# Patient Record
Sex: MALE | Race: WHITE | NOT HISPANIC OR LATINO | Employment: OTHER | URBAN - METROPOLITAN AREA
[De-identification: names, ages, dates, MRNs, and addresses within clinical notes are randomized per-mention and may not be internally consistent; named-entity substitution may affect disease eponyms.]

---

## 2017-02-27 ENCOUNTER — TRANSCRIBE ORDERS (OUTPATIENT)
Dept: ADMINISTRATIVE | Facility: HOSPITAL | Age: 67
End: 2017-02-27

## 2017-02-27 ENCOUNTER — LAB (OUTPATIENT)
Dept: LAB | Facility: HOSPITAL | Age: 67
End: 2017-02-27
Attending: ORTHOPAEDIC SURGERY

## 2017-02-27 ENCOUNTER — HOSPITAL ENCOUNTER (OUTPATIENT)
Dept: CARDIOLOGY | Facility: HOSPITAL | Age: 67
Discharge: HOME | End: 2017-02-27
Attending: ORTHOPAEDIC SURGERY

## 2017-02-27 ENCOUNTER — HOSPITAL ENCOUNTER (OUTPATIENT)
Dept: GENERAL RADIOLOGY | Facility: HOSPITAL | Age: 67
Discharge: HOME | End: 2017-02-27
Attending: ORTHOPAEDIC SURGERY | Admitting: ORTHOPAEDIC SURGERY

## 2017-02-27 DIAGNOSIS — Z01.818 PRE-OP TESTING: ICD-10-CM

## 2017-02-27 DIAGNOSIS — Z01.811 PRE-OP CHEST EXAM: ICD-10-CM

## 2017-02-27 DIAGNOSIS — M16.12 OSTEOARTHRITIS OF LEFT HIP, UNSPECIFIED OSTEOARTHRITIS TYPE: ICD-10-CM

## 2017-02-27 DIAGNOSIS — M16.12 OSTEOARTHRITIS OF LEFT HIP, UNSPECIFIED OSTEOARTHRITIS TYPE: Primary | ICD-10-CM

## 2017-02-27 LAB
ANION GAP SERPL CALCULATED.3IONS-SCNC: 12.7 MMOL/L
APTT PPP: 25.5 SECONDS (ref 22.7–35.4)
BASOPHILS # BLD AUTO: 0 10*3/MM3 (ref 0–0.2)
BASOPHILS NFR BLD AUTO: 0 % (ref 0–1.5)
BILIRUB UR QL STRIP: NEGATIVE
BUN BLD-MCNC: 16 MG/DL (ref 8–23)
BUN/CREAT SERPL: 16.7 (ref 7–25)
CALCIUM SPEC-SCNC: 9.6 MG/DL (ref 8.6–10.5)
CHLORIDE SERPL-SCNC: 102 MMOL/L (ref 98–107)
CLARITY UR: CLEAR
CO2 SERPL-SCNC: 24.3 MMOL/L (ref 22–29)
COLOR UR: YELLOW
CREAT BLD-MCNC: 0.96 MG/DL (ref 0.76–1.27)
DEPRECATED RDW RBC AUTO: 43 FL (ref 37–54)
EOSINOPHIL # BLD AUTO: 0.15 10*3/MM3 (ref 0–0.7)
EOSINOPHIL NFR BLD AUTO: 1.9 % (ref 0.3–6.2)
ERYTHROCYTE [DISTWIDTH] IN BLOOD BY AUTOMATED COUNT: 12.8 % (ref 11.5–14.5)
GFR SERPL CREATININE-BSD FRML MDRD: 79 ML/MIN/1.73
GLUCOSE BLD-MCNC: 118 MG/DL (ref 65–99)
GLUCOSE UR STRIP-MCNC: NEGATIVE MG/DL
HBA1C MFR BLD: 6.3 % (ref 4.8–5.6)
HCT VFR BLD AUTO: 44.7 % (ref 40.4–52.2)
HGB BLD-MCNC: 15.3 G/DL (ref 13.7–17.6)
HGB UR QL STRIP.AUTO: NEGATIVE
IMM GRANULOCYTES # BLD: 0.02 10*3/MM3 (ref 0–0.03)
IMM GRANULOCYTES NFR BLD: 0.3 % (ref 0–0.5)
INR PPP: 0.95 (ref 0.9–1.1)
KETONES UR QL STRIP: NEGATIVE
LEUKOCYTE ESTERASE UR QL STRIP.AUTO: NEGATIVE
LYMPHOCYTES # BLD AUTO: 1.81 10*3/MM3 (ref 0.9–4.8)
LYMPHOCYTES NFR BLD AUTO: 23.3 % (ref 19.6–45.3)
MCH RBC QN AUTO: 31.8 PG (ref 27–32.7)
MCHC RBC AUTO-ENTMCNC: 34.2 G/DL (ref 32.6–36.4)
MCV RBC AUTO: 92.9 FL (ref 79.8–96.2)
MONOCYTES # BLD AUTO: 0.51 10*3/MM3 (ref 0.2–1.2)
MONOCYTES NFR BLD AUTO: 6.6 % (ref 5–12)
NEUTROPHILS # BLD AUTO: 5.29 10*3/MM3 (ref 1.9–8.1)
NEUTROPHILS NFR BLD AUTO: 67.9 % (ref 42.7–76)
NITRITE UR QL STRIP: NEGATIVE
PH UR STRIP.AUTO: <=5 [PH] (ref 5–8)
PLATELET # BLD AUTO: 183 10*3/MM3 (ref 140–500)
PMV BLD AUTO: 10.8 FL (ref 6–12)
POTASSIUM BLD-SCNC: 4.8 MMOL/L (ref 3.5–5.2)
PROT UR QL STRIP: NEGATIVE
PROTHROMBIN TIME: 12.3 SECONDS (ref 11.7–14.2)
RBC # BLD AUTO: 4.81 10*6/MM3 (ref 4.6–6)
SODIUM BLD-SCNC: 139 MMOL/L (ref 136–145)
SP GR UR STRIP: 1.02 (ref 1–1.03)
UROBILINOGEN UR QL STRIP: NORMAL
WBC NRBC COR # BLD: 7.78 10*3/MM3 (ref 4.5–10.7)

## 2017-02-27 PROCEDURE — 85610 PROTHROMBIN TIME: CPT

## 2017-02-27 PROCEDURE — 85730 THROMBOPLASTIN TIME PARTIAL: CPT

## 2017-02-27 PROCEDURE — 85025 COMPLETE CBC W/AUTO DIFF WBC: CPT

## 2017-02-27 PROCEDURE — 93005 ELECTROCARDIOGRAM TRACING: CPT | Performed by: ORTHOPAEDIC SURGERY

## 2017-02-27 PROCEDURE — 81003 URINALYSIS AUTO W/O SCOPE: CPT

## 2017-02-27 PROCEDURE — 71020 HC CHEST PA AND LATERAL: CPT

## 2017-02-27 PROCEDURE — 80048 BASIC METABOLIC PNL TOTAL CA: CPT

## 2017-02-27 PROCEDURE — 93010 ELECTROCARDIOGRAM REPORT: CPT | Performed by: INTERNAL MEDICINE

## 2017-02-27 PROCEDURE — 83036 HEMOGLOBIN GLYCOSYLATED A1C: CPT

## 2017-02-27 PROCEDURE — 36415 COLL VENOUS BLD VENIPUNCTURE: CPT

## 2017-03-06 RX ORDER — ATORVASTATIN CALCIUM 20 MG/1
20 TABLET, FILM COATED ORAL DAILY
COMMUNITY

## 2017-03-06 RX ORDER — ZINC GLUCONATE 50 MG
1 TABLET ORAL DAILY
COMMUNITY

## 2017-03-06 RX ORDER — CHLORAL HYDRATE 500 MG
1000 CAPSULE ORAL
COMMUNITY

## 2017-03-06 RX ORDER — ASPIRIN 81 MG/1
81 TABLET, CHEWABLE ORAL DAILY
COMMUNITY
End: 2017-03-08 | Stop reason: HOSPADM

## 2017-03-06 RX ORDER — MELOXICAM 15 MG/1
15 TABLET ORAL DAILY
COMMUNITY
End: 2017-03-08 | Stop reason: HOSPADM

## 2017-03-06 RX ORDER — VITAMIN B COMPLEX
1 CAPSULE ORAL DAILY
COMMUNITY

## 2017-03-06 RX ORDER — GABAPENTIN 300 MG/1
400 CAPSULE ORAL 2 TIMES DAILY
COMMUNITY
End: 2022-09-15

## 2017-03-06 RX ORDER — DOXAZOSIN 2 MG/1
2 TABLET ORAL NIGHTLY
COMMUNITY
End: 2022-09-15

## 2017-03-06 RX ORDER — LISINOPRIL AND HYDROCHLOROTHIAZIDE 20; 12.5 MG/1; MG/1
1 TABLET ORAL DAILY
COMMUNITY
End: 2022-09-15 | Stop reason: ALTCHOICE

## 2017-03-06 NOTE — H&P
Provider: BARRINGTON LEBLANC MD  GELY WOODWARD is a 66 year old male whose main reason for today's visit is pain in the left knee which has been present for 3 years.  The patient denies an injury.  The patient is on pain meds(MOBIC 15 MG TABS (MELOXICAM)).  The patient states that he is having a sharp pain which he rates at a 10 on a scale from 1-10.  The pain occurs intermittently.  The pain is located on the side of the knee.  The pain occurs on the left side only.  The pain is not radiating.  The patient states that rest makes it better, while activity, standing and walking makes it worse.  Treatments which did not give relief include joint injections performed by Dr Leblanc.    He is known to me from his nonoperative management for the left knee osteoarthritis.  He has received a Kenalog injections, most recent was in November 2016.  He had good relief for  Several weeks, but the injection has worn off.  He feels that the injection is not giving him as much relief as it used to.  He has had several injections in the past.  He continues to use the Mobic.  The medicine is not helping.  He has difficulty with activities of daily living.  He runs a wood  shop.  He has difficulty with  stairs, difficulty getting up from a seated position.  Complains of night pain.  Complains of limp.  He does not use any assistive device to ambulate.  He does feel partial giving way, especially on inclines.  He has a history of right total knee arthroplasty by Dr. Temo Nicolas in 2000.  He is overall very satisfied with the right knee replacement.  He would like to proceed with a left total knee arthroplasty.  Denies any history of MRSA.  Denies any history of DVT.  Has a history of diabetes mellitus type 1.  Negative metal sensitivity.  Review of Systems:  Positive for: Joint Pain.    Patient denies: Abdominal Pain, Bleeding, Chest Pain, Convulsions/Seizure, Decreased Motion, Depression, Difficulty Swallowing, Easy  Bruisability, Emotional Disturbances, Eyes or Vision Problems, Fecal Incontinence, Fever/Chills, Headaches, Increased Thirst, Increased Hunger, Insomnia, Nausea/Vomiting, Night Sweats, Poor Balance, Persistent Cough, Rash, Shortness of Breath, Shortness of Breath While Lying down, Skin Problems, Urinary Retention and Weakness.  Allergies:  * no known allergies  * metal - negative  Medications:  mobic 15 mg tabs (meloxicam) 1 po qd with food  mobic 15 mg tabs (meloxicam) 1 po qd with food  gabapentin 300 mg oral caps (gabapentin) daily  doxazosin mesylate 2 mg oral tabs (doxazosin mesylate) daily  aspirin low dose 81 mg oral tbec (aspirin) daily  atorvastatin calcium 20 mg oral tabs (atorvastatin calcium) daily  metformin hcl 500 mg oral tabs (metformin hcl) 2x day  lisinopril-hydrochlorothiazide 20-12.5 mg oral tabs (lisinopril-hydrochlorothiazide) daily  Patient History of:  SLEEP APNEA/CPAP/BIPAP  HIGH CHOLESTEROL  BLOOD CLOTS/EMBOLISM - NEGATIVE  HYPERTENSION  DIABETES - TYPE 1  CANCER - SKIN  Surgical History:  Tooth Extraction-[CPT-44782]   Skin Lesion Removal-[CPT-88279]   right  Shoulder Arthroscopy-[CPT-95832]   right Rotator Cuff Repair-[CPT-15187]   right Total Knee Arthroplasty-[CPT-22968]   Known Family History of:  kidney disease-sibling  diabetes-sibling  diabetes-father  Social History:  Social history taken on 02/27/2017 states CRISSY HILL is a  66 year old male.  He has never used tobacco products.      Past medical, social, family histories and ROS reviewed today with the patient and changes documented in the chart (02/27/2017).  PCP Dr. FABIÁN GARCIA, KAVON CUMMINGS    Physical Exam  Height:  69 in.    Weight:  261 lbs.     BMI:  38.68  Pulse:  86  Blood pressure:  130 / 82 mm Hg    Gait: antalgic               Ambulation: patient ambulates without assistive devices      Mental/HEENT/Cardio/Skin  The patient's general appearance is well developed, well nourished, no acute distress.  Orientation is  alert and oriented x 3.  The patient's mood is normal.  A head exam reveals normocephalic/atraumatic.  An eye exam reveals pupils equal.  Pulmonary exam shows normal air exchange, no labored breathing, or shortness of breath.  A skin exam shows normal temperature and color in the area of examination.      Left Knee  Skin is normal.  Varus alignment is correctable to neutral.  There is quad atrophy.  Effusion is 1+.  No warmth.  No erythema.  Normal sensation.  Capillary refill is normal.  Reflexes are normal.  Range of motion of the knee is 5 to <120 degrees of flexion.  There is moderate tenderness in the medial patella femoral.  Moderate patellar crepitation.  The leg lengths are equal.  Pes planus is negative.  PTTI is negative.        Imaging/Diagnostic Studies    X-rays of the left knee were taken previously taken in the officeand reviewed.  X-rays show there is severe narrowing of the medial joint spaces.  bone on bone arthrosis.  Medial spurring.  Alignment is 0-5 degrees varus.    Impression  Left shoulder joint pain (KIV07-C66.512)  Left partial chronic rotator cuff tear (HZQ53-P97.111)  Left knee primary osteoarthritis (RIL89-H82.12)    Plan  The patient failed nonoperative management.  The patient is indicated for a total knee arthroplasty.    The likely  risks and benefits of the procedure including but not limited to infection, DVT, pulmonary embolism, future loosening of the implants, possibility of injury to nerves,  vessels or tendons, periprosthetic fractures have been discussed in detail with patient  .  Despite the risks involved  patient would like to proceed.   Surgery  is being scheduled for a LEFT total knee arthroplasty at [facility] on March 7, 2017.  PAT and joint class will be scheduled.   I will request clearance from his family physician  ---  check hemoglobin A1c.  He will bring his CPAP machine to the surgery.  Plan for a spinal and adductor canal block.  Follow up postoperative.    Patient does not use tobacco.      We discussed the benefits of surgical intervention, as well as alternative treatments.  Potential surgical risks and complications include but are not limited to DVT, infection, neurovascular injury, fracture, implant wear, failure, possible need for revision surgery, loss of motion, dislocation, limb length changes.  Sufficient opportunity was given to discuss the condition and treatment plan with the doctor, and all questions were answered for the patient.  Nonsurgical measures such as injections, medications, or physical therapy may not offer significant relief to this patient.  The discussion lasted 30 minutes.      Nixon should follow up with BARRINGTON LEBLANC MD post op.

## 2017-03-07 ENCOUNTER — APPOINTMENT (OUTPATIENT)
Dept: GENERAL RADIOLOGY | Facility: HOSPITAL | Age: 67
End: 2017-03-07

## 2017-03-07 ENCOUNTER — HOSPITAL ENCOUNTER (INPATIENT)
Facility: HOSPITAL | Age: 67
LOS: 2 days | Discharge: HOME HEALTH | End: 2017-03-09
Attending: ORTHOPAEDIC SURGERY | Admitting: ORTHOPAEDIC SURGERY

## 2017-03-07 ENCOUNTER — ANESTHESIA EVENT (OUTPATIENT)
Dept: PERIOP | Facility: HOSPITAL | Age: 67
End: 2017-03-07

## 2017-03-07 ENCOUNTER — ANESTHESIA (OUTPATIENT)
Dept: PERIOP | Facility: HOSPITAL | Age: 67
End: 2017-03-07

## 2017-03-07 DIAGNOSIS — M17.12 PRIMARY OSTEOARTHRITIS OF LEFT KNEE: Primary | ICD-10-CM

## 2017-03-07 PROBLEM — M17.9 OSTEOARTHRITIS, KNEE: Status: ACTIVE | Noted: 2017-03-07

## 2017-03-07 LAB
ABO GROUP BLD: NORMAL
BLD GP AB SCN SERPL QL: NEGATIVE
GLUCOSE BLDC GLUCOMTR-MCNC: 125 MG/DL (ref 70–130)
GLUCOSE BLDC GLUCOMTR-MCNC: 129 MG/DL (ref 70–130)
GLUCOSE BLDC GLUCOMTR-MCNC: 203 MG/DL (ref 70–130)
GLUCOSE BLDC GLUCOMTR-MCNC: 220 MG/DL (ref 70–130)
HBA1C MFR BLD: 6.16 % (ref 4.8–5.6)
RH BLD: POSITIVE

## 2017-03-07 PROCEDURE — 25010000002 PROPOFOL 10 MG/ML EMULSION: Performed by: NURSE ANESTHETIST, CERTIFIED REGISTERED

## 2017-03-07 PROCEDURE — 97110 THERAPEUTIC EXERCISES: CPT

## 2017-03-07 PROCEDURE — 0SRD0J9 REPLACEMENT OF LEFT KNEE JOINT WITH SYNTHETIC SUBSTITUTE, CEMENTED, OPEN APPROACH: ICD-10-PCS | Performed by: ORTHOPAEDIC SURGERY

## 2017-03-07 PROCEDURE — C1713 ANCHOR/SCREW BN/BN,TIS/BN: HCPCS | Performed by: ORTHOPAEDIC SURGERY

## 2017-03-07 PROCEDURE — 86900 BLOOD TYPING SEROLOGIC ABO: CPT | Performed by: ORTHOPAEDIC SURGERY

## 2017-03-07 PROCEDURE — 97161 PT EVAL LOW COMPLEX 20 MIN: CPT

## 2017-03-07 PROCEDURE — L1830 KO IMMOB CANVAS LONG PRE OTS: HCPCS | Performed by: ORTHOPAEDIC SURGERY

## 2017-03-07 PROCEDURE — 94799 UNLISTED PULMONARY SVC/PX: CPT

## 2017-03-07 PROCEDURE — 82962 GLUCOSE BLOOD TEST: CPT

## 2017-03-07 PROCEDURE — C1776 JOINT DEVICE (IMPLANTABLE): HCPCS | Performed by: ORTHOPAEDIC SURGERY

## 2017-03-07 PROCEDURE — 25010000002 MIDAZOLAM PER 1 MG: Performed by: ANESTHESIOLOGY

## 2017-03-07 PROCEDURE — 25010000003 CEFAZOLIN IN DEXTROSE 2-4 GM/100ML-% SOLUTION: Performed by: ORTHOPAEDIC SURGERY

## 2017-03-07 PROCEDURE — 86850 RBC ANTIBODY SCREEN: CPT | Performed by: ORTHOPAEDIC SURGERY

## 2017-03-07 PROCEDURE — 25010000002 NEOSTIGMINE 10 MG/10ML SOLUTION: Performed by: NURSE ANESTHETIST, CERTIFIED REGISTERED

## 2017-03-07 PROCEDURE — 25010000002 HYDROMORPHONE PER 4 MG: Performed by: NURSE ANESTHETIST, CERTIFIED REGISTERED

## 2017-03-07 PROCEDURE — 83036 HEMOGLOBIN GLYCOSYLATED A1C: CPT | Performed by: ORTHOPAEDIC SURGERY

## 2017-03-07 PROCEDURE — 25010000002 ROPIVACAINE PER 1 MG: Performed by: ORTHOPAEDIC SURGERY

## 2017-03-07 PROCEDURE — 25010000002 DEXAMETHASONE PER 1 MG: Performed by: NURSE ANESTHETIST, CERTIFIED REGISTERED

## 2017-03-07 PROCEDURE — 25010000002 FENTANYL CITRATE (PF) 100 MCG/2ML SOLUTION: Performed by: NURSE ANESTHETIST, CERTIFIED REGISTERED

## 2017-03-07 PROCEDURE — 86901 BLOOD TYPING SEROLOGIC RH(D): CPT | Performed by: ORTHOPAEDIC SURGERY

## 2017-03-07 PROCEDURE — 73560 X-RAY EXAM OF KNEE 1 OR 2: CPT

## 2017-03-07 PROCEDURE — 25010000002 KETOROLAC TROMETHAMINE PER 15 MG: Performed by: NURSE ANESTHETIST, CERTIFIED REGISTERED

## 2017-03-07 PROCEDURE — 25010000002 ROPIVACAINE PER 1 MG: Performed by: ANESTHESIOLOGY

## 2017-03-07 PROCEDURE — 25010000002 FENTANYL CITRATE (PF) 100 MCG/2ML SOLUTION: Performed by: ANESTHESIOLOGY

## 2017-03-07 PROCEDURE — 63710000001 INSULIN ASPART PER 5 UNITS: Performed by: HOSPITALIST

## 2017-03-07 PROCEDURE — 25010000002 MIDAZOLAM PER 1 MG: Performed by: NURSE ANESTHETIST, CERTIFIED REGISTERED

## 2017-03-07 PROCEDURE — 25010000002 ONDANSETRON PER 1 MG: Performed by: NURSE ANESTHETIST, CERTIFIED REGISTERED

## 2017-03-07 DEVICE — ATTUNE KNEE SYSTEM FEMORAL POSTERIOR STABILIZED SIZE 7 LEFT CEMENTED
Type: IMPLANTABLE DEVICE | Site: KNEE | Status: FUNCTIONAL
Brand: ATTUNE

## 2017-03-07 DEVICE — ATTUNE PATELLA MEDIALIZED DOME 35MM CEMENTED AOX
Type: IMPLANTABLE DEVICE | Site: KNEE | Status: FUNCTIONAL
Brand: ATTUNE

## 2017-03-07 DEVICE — TOTL KN ATTUNE DEPUY 9527038: Type: IMPLANTABLE DEVICE | Site: KNEE | Status: FUNCTIONAL

## 2017-03-07 DEVICE — TOBRA FULL DOSE ANTIBIOTIC BONE CEMENT, 10 PACK CATALOG NUMBER IS 6197-9-010
Type: IMPLANTABLE DEVICE | Site: KNEE | Status: FUNCTIONAL
Brand: SIMPLEX

## 2017-03-07 DEVICE — ATTUNE KNEE SYSTEM TIBIAL BASE FIXED BEARING SIZE 7 CEMENTED
Type: IMPLANTABLE DEVICE | Site: KNEE | Status: FUNCTIONAL
Brand: ATTUNE

## 2017-03-07 DEVICE — ATTUNE KNEE SYSTEM TIBIAL INSERT FIXED BEARING POSTERIOR STABILIZED 7 7MM AOX
Type: IMPLANTABLE DEVICE | Site: KNEE | Status: FUNCTIONAL
Brand: ATTUNE

## 2017-03-07 RX ORDER — NEOSTIGMINE METHYLSULFATE 1 MG/ML
INJECTION, SOLUTION INTRAVENOUS AS NEEDED
Status: DISCONTINUED | OUTPATIENT
Start: 2017-03-07 | End: 2017-03-07 | Stop reason: SURG

## 2017-03-07 RX ORDER — FENTANYL CITRATE 50 UG/ML
50 INJECTION, SOLUTION INTRAMUSCULAR; INTRAVENOUS
Status: DISCONTINUED | OUTPATIENT
Start: 2017-03-07 | End: 2017-03-07 | Stop reason: HOSPADM

## 2017-03-07 RX ORDER — GABAPENTIN 300 MG/1
300 CAPSULE ORAL DAILY
Status: DISCONTINUED | OUTPATIENT
Start: 2017-03-07 | End: 2017-03-09 | Stop reason: HOSPADM

## 2017-03-07 RX ORDER — DOCUSATE SODIUM 100 MG/1
100 CAPSULE, LIQUID FILLED ORAL 2 TIMES DAILY
Status: DISCONTINUED | OUTPATIENT
Start: 2017-03-07 | End: 2017-03-09 | Stop reason: HOSPADM

## 2017-03-07 RX ORDER — NICOTINE POLACRILEX 4 MG
15 LOZENGE BUCCAL
Status: DISCONTINUED | OUTPATIENT
Start: 2017-03-07 | End: 2017-03-09 | Stop reason: HOSPADM

## 2017-03-07 RX ORDER — HYDROCODONE BITARTRATE AND ACETAMINOPHEN 7.5; 325 MG/1; MG/1
1 TABLET ORAL ONCE AS NEEDED
Status: DISCONTINUED | OUTPATIENT
Start: 2017-03-07 | End: 2017-03-07 | Stop reason: HOSPADM

## 2017-03-07 RX ORDER — ROCURONIUM BROMIDE 10 MG/ML
INJECTION, SOLUTION INTRAVENOUS AS NEEDED
Status: DISCONTINUED | OUTPATIENT
Start: 2017-03-07 | End: 2017-03-07 | Stop reason: SURG

## 2017-03-07 RX ORDER — PROMETHAZINE HYDROCHLORIDE 25 MG/ML
12.5 INJECTION, SOLUTION INTRAMUSCULAR; INTRAVENOUS ONCE AS NEEDED
Status: DISCONTINUED | OUTPATIENT
Start: 2017-03-07 | End: 2017-03-07 | Stop reason: HOSPADM

## 2017-03-07 RX ORDER — SODIUM CHLORIDE 9 MG/ML
100 INJECTION, SOLUTION INTRAVENOUS CONTINUOUS
Status: ACTIVE | OUTPATIENT
Start: 2017-03-07 | End: 2017-03-08

## 2017-03-07 RX ORDER — NALOXONE HCL 0.4 MG/ML
0.4 VIAL (ML) INJECTION
Status: DISCONTINUED | OUTPATIENT
Start: 2017-03-07 | End: 2017-03-09 | Stop reason: HOSPADM

## 2017-03-07 RX ORDER — LABETALOL HYDROCHLORIDE 5 MG/ML
5 INJECTION, SOLUTION INTRAVENOUS
Status: DISCONTINUED | OUTPATIENT
Start: 2017-03-07 | End: 2017-03-07 | Stop reason: HOSPADM

## 2017-03-07 RX ORDER — MIDAZOLAM HYDROCHLORIDE 1 MG/ML
1 INJECTION INTRAMUSCULAR; INTRAVENOUS
Status: DISCONTINUED | OUTPATIENT
Start: 2017-03-07 | End: 2017-03-07 | Stop reason: HOSPADM

## 2017-03-07 RX ORDER — ONDANSETRON 2 MG/ML
4 INJECTION INTRAMUSCULAR; INTRAVENOUS EVERY 6 HOURS PRN
Status: DISCONTINUED | OUTPATIENT
Start: 2017-03-07 | End: 2017-03-09 | Stop reason: HOSPADM

## 2017-03-07 RX ORDER — ROPIVACAINE HYDROCHLORIDE 5 MG/ML
INJECTION, SOLUTION EPIDURAL; INFILTRATION; PERINEURAL AS NEEDED
Status: DISCONTINUED | OUTPATIENT
Start: 2017-03-07 | End: 2017-03-07 | Stop reason: SURG

## 2017-03-07 RX ORDER — DEXTROSE MONOHYDRATE 25 G/50ML
25 INJECTION, SOLUTION INTRAVENOUS
Status: DISCONTINUED | OUTPATIENT
Start: 2017-03-07 | End: 2017-03-09 | Stop reason: HOSPADM

## 2017-03-07 RX ORDER — ACETAMINOPHEN 325 MG/1
325 TABLET ORAL EVERY 4 HOURS PRN
Status: DISCONTINUED | OUTPATIENT
Start: 2017-03-07 | End: 2017-03-09 | Stop reason: HOSPADM

## 2017-03-07 RX ORDER — CEFAZOLIN SODIUM 2 G/100ML
2 INJECTION, SOLUTION INTRAVENOUS EVERY 8 HOURS
Status: COMPLETED | OUTPATIENT
Start: 2017-03-07 | End: 2017-03-07

## 2017-03-07 RX ORDER — ASPIRIN 325 MG
325 TABLET, DELAYED RELEASE (ENTERIC COATED) ORAL 2 TIMES DAILY
Status: DISCONTINUED | OUTPATIENT
Start: 2017-03-07 | End: 2017-03-09 | Stop reason: HOSPADM

## 2017-03-07 RX ORDER — CELECOXIB 200 MG/1
200 CAPSULE ORAL DAILY
Status: DISCONTINUED | OUTPATIENT
Start: 2017-03-08 | End: 2017-03-09 | Stop reason: HOSPADM

## 2017-03-07 RX ORDER — SODIUM CHLORIDE, SODIUM LACTATE, POTASSIUM CHLORIDE, CALCIUM CHLORIDE 600; 310; 30; 20 MG/100ML; MG/100ML; MG/100ML; MG/100ML
9 INJECTION, SOLUTION INTRAVENOUS CONTINUOUS
Status: DISCONTINUED | OUTPATIENT
Start: 2017-03-07 | End: 2017-03-07 | Stop reason: HOSPADM

## 2017-03-07 RX ORDER — DIPHENHYDRAMINE HYDROCHLORIDE 50 MG/ML
12.5 INJECTION INTRAMUSCULAR; INTRAVENOUS
Status: DISCONTINUED | OUTPATIENT
Start: 2017-03-07 | End: 2017-03-07 | Stop reason: HOSPADM

## 2017-03-07 RX ORDER — TERAZOSIN 2 MG/1
2 CAPSULE ORAL NIGHTLY
Status: DISCONTINUED | OUTPATIENT
Start: 2017-03-07 | End: 2017-03-09 | Stop reason: HOSPADM

## 2017-03-07 RX ORDER — MIDAZOLAM HYDROCHLORIDE 1 MG/ML
2 INJECTION INTRAMUSCULAR; INTRAVENOUS
Status: DISCONTINUED | OUTPATIENT
Start: 2017-03-07 | End: 2017-03-07 | Stop reason: HOSPADM

## 2017-03-07 RX ORDER — CEFAZOLIN SODIUM 2 G/100ML
2 INJECTION, SOLUTION INTRAVENOUS ONCE
Status: COMPLETED | OUTPATIENT
Start: 2017-03-07 | End: 2017-03-07

## 2017-03-07 RX ORDER — LISINOPRIL 20 MG/1
20 TABLET ORAL
Status: DISCONTINUED | OUTPATIENT
Start: 2017-03-08 | End: 2017-03-09 | Stop reason: HOSPADM

## 2017-03-07 RX ORDER — PROMETHAZINE HYDROCHLORIDE 25 MG/1
25 TABLET ORAL ONCE AS NEEDED
Status: DISCONTINUED | OUTPATIENT
Start: 2017-03-07 | End: 2017-03-07 | Stop reason: HOSPADM

## 2017-03-07 RX ORDER — CELECOXIB 200 MG/1
200 CAPSULE ORAL ONCE
Status: COMPLETED | OUTPATIENT
Start: 2017-03-07 | End: 2017-03-07

## 2017-03-07 RX ORDER — LIDOCAINE HYDROCHLORIDE 20 MG/ML
INJECTION, SOLUTION INFILTRATION; PERINEURAL AS NEEDED
Status: DISCONTINUED | OUTPATIENT
Start: 2017-03-07 | End: 2017-03-07 | Stop reason: SURG

## 2017-03-07 RX ORDER — MIDAZOLAM HYDROCHLORIDE 1 MG/ML
INJECTION INTRAMUSCULAR; INTRAVENOUS AS NEEDED
Status: DISCONTINUED | OUTPATIENT
Start: 2017-03-07 | End: 2017-03-07 | Stop reason: SURG

## 2017-03-07 RX ORDER — ONDANSETRON 2 MG/ML
4 INJECTION INTRAMUSCULAR; INTRAVENOUS ONCE AS NEEDED
Status: DISCONTINUED | OUTPATIENT
Start: 2017-03-07 | End: 2017-03-07 | Stop reason: HOSPADM

## 2017-03-07 RX ORDER — HYDROMORPHONE HYDROCHLORIDE 1 MG/ML
0.5 INJECTION, SOLUTION INTRAMUSCULAR; INTRAVENOUS; SUBCUTANEOUS
Status: DISCONTINUED | OUTPATIENT
Start: 2017-03-07 | End: 2017-03-07 | Stop reason: HOSPADM

## 2017-03-07 RX ORDER — BISACODYL 10 MG
10 SUPPOSITORY, RECTAL RECTAL DAILY PRN
Status: DISCONTINUED | OUTPATIENT
Start: 2017-03-07 | End: 2017-03-09 | Stop reason: HOSPADM

## 2017-03-07 RX ORDER — FLUMAZENIL 0.1 MG/ML
0.2 INJECTION INTRAVENOUS AS NEEDED
Status: DISCONTINUED | OUTPATIENT
Start: 2017-03-07 | End: 2017-03-07 | Stop reason: HOSPADM

## 2017-03-07 RX ORDER — ROPIVACAINE HYDROCHLORIDE 5 MG/ML
INJECTION, SOLUTION EPIDURAL; INFILTRATION; PERINEURAL AS NEEDED
Status: DISCONTINUED | OUTPATIENT
Start: 2017-03-07 | End: 2017-03-07 | Stop reason: HOSPADM

## 2017-03-07 RX ORDER — OXYCODONE HCL 10 MG/1
10 TABLET, FILM COATED, EXTENDED RELEASE ORAL ONCE
Status: COMPLETED | OUTPATIENT
Start: 2017-03-07 | End: 2017-03-07

## 2017-03-07 RX ORDER — PROMETHAZINE HYDROCHLORIDE 25 MG/1
12.5 TABLET ORAL ONCE AS NEEDED
Status: DISCONTINUED | OUTPATIENT
Start: 2017-03-07 | End: 2017-03-07 | Stop reason: HOSPADM

## 2017-03-07 RX ORDER — HYDRALAZINE HYDROCHLORIDE 20 MG/ML
5 INJECTION INTRAMUSCULAR; INTRAVENOUS
Status: DISCONTINUED | OUTPATIENT
Start: 2017-03-07 | End: 2017-03-07 | Stop reason: HOSPADM

## 2017-03-07 RX ORDER — NALOXONE HCL 0.4 MG/ML
0.2 VIAL (ML) INJECTION AS NEEDED
Status: DISCONTINUED | OUTPATIENT
Start: 2017-03-07 | End: 2017-03-07 | Stop reason: HOSPADM

## 2017-03-07 RX ORDER — HYDROCODONE BITARTRATE AND ACETAMINOPHEN 7.5; 325 MG/1; MG/1
1 TABLET ORAL EVERY 4 HOURS PRN
Status: DISCONTINUED | OUTPATIENT
Start: 2017-03-07 | End: 2017-03-09 | Stop reason: HOSPADM

## 2017-03-07 RX ORDER — HYDROMORPHONE HCL 110MG/55ML
PATIENT CONTROLLED ANALGESIA SYRINGE INTRAVENOUS AS NEEDED
Status: DISCONTINUED | OUTPATIENT
Start: 2017-03-07 | End: 2017-03-07 | Stop reason: SURG

## 2017-03-07 RX ORDER — DOXAZOSIN 2 MG/1
2 TABLET ORAL NIGHTLY
Status: DISCONTINUED | OUTPATIENT
Start: 2017-03-07 | End: 2017-03-07 | Stop reason: CLARIF

## 2017-03-07 RX ORDER — PROPOFOL 10 MG/ML
VIAL (ML) INTRAVENOUS AS NEEDED
Status: DISCONTINUED | OUTPATIENT
Start: 2017-03-07 | End: 2017-03-07 | Stop reason: SURG

## 2017-03-07 RX ORDER — SODIUM CHLORIDE 0.9 % (FLUSH) 0.9 %
1-10 SYRINGE (ML) INJECTION AS NEEDED
Status: DISCONTINUED | OUTPATIENT
Start: 2017-03-07 | End: 2017-03-07 | Stop reason: HOSPADM

## 2017-03-07 RX ORDER — KETOROLAC TROMETHAMINE 30 MG/ML
INJECTION, SOLUTION INTRAMUSCULAR; INTRAVENOUS AS NEEDED
Status: DISCONTINUED | OUTPATIENT
Start: 2017-03-07 | End: 2017-03-07 | Stop reason: SURG

## 2017-03-07 RX ORDER — LISINOPRIL AND HYDROCHLOROTHIAZIDE 20; 12.5 MG/1; MG/1
1 TABLET ORAL DAILY
Status: DISCONTINUED | OUTPATIENT
Start: 2017-03-07 | End: 2017-03-07

## 2017-03-07 RX ORDER — GLYCOPYRROLATE 0.2 MG/ML
INJECTION INTRAMUSCULAR; INTRAVENOUS AS NEEDED
Status: DISCONTINUED | OUTPATIENT
Start: 2017-03-07 | End: 2017-03-07 | Stop reason: SURG

## 2017-03-07 RX ORDER — FAMOTIDINE 10 MG/ML
20 INJECTION, SOLUTION INTRAVENOUS ONCE
Status: COMPLETED | OUTPATIENT
Start: 2017-03-07 | End: 2017-03-07

## 2017-03-07 RX ORDER — OXYCODONE AND ACETAMINOPHEN 7.5; 325 MG/1; MG/1
1 TABLET ORAL ONCE AS NEEDED
Status: DISCONTINUED | OUTPATIENT
Start: 2017-03-07 | End: 2017-03-07 | Stop reason: HOSPADM

## 2017-03-07 RX ORDER — DEXAMETHASONE SODIUM PHOSPHATE 10 MG/ML
INJECTION INTRAMUSCULAR; INTRAVENOUS AS NEEDED
Status: DISCONTINUED | OUTPATIENT
Start: 2017-03-07 | End: 2017-03-07 | Stop reason: SURG

## 2017-03-07 RX ORDER — PROMETHAZINE HYDROCHLORIDE 25 MG/1
25 SUPPOSITORY RECTAL ONCE AS NEEDED
Status: DISCONTINUED | OUTPATIENT
Start: 2017-03-07 | End: 2017-03-07 | Stop reason: HOSPADM

## 2017-03-07 RX ORDER — FENTANYL CITRATE 50 UG/ML
INJECTION, SOLUTION INTRAMUSCULAR; INTRAVENOUS AS NEEDED
Status: DISCONTINUED | OUTPATIENT
Start: 2017-03-07 | End: 2017-03-07 | Stop reason: SURG

## 2017-03-07 RX ORDER — ONDANSETRON 2 MG/ML
INJECTION INTRAMUSCULAR; INTRAVENOUS AS NEEDED
Status: DISCONTINUED | OUTPATIENT
Start: 2017-03-07 | End: 2017-03-07 | Stop reason: SURG

## 2017-03-07 RX ORDER — TRANEXAMIC ACID 100 MG/ML
INJECTION, SOLUTION INTRAVENOUS AS NEEDED
Status: DISCONTINUED | OUTPATIENT
Start: 2017-03-07 | End: 2017-03-07 | Stop reason: SURG

## 2017-03-07 RX ADMIN — HYDROMORPHONE HYDROCHLORIDE 0.5 MG: 1 INJECTION, SOLUTION INTRAMUSCULAR; INTRAVENOUS; SUBCUTANEOUS at 10:16

## 2017-03-07 RX ADMIN — CEFAZOLIN SODIUM 2 G: 2 INJECTION, SOLUTION INTRAVENOUS at 07:24

## 2017-03-07 RX ADMIN — CELECOXIB 200 MG: 200 CAPSULE ORAL at 06:48

## 2017-03-07 RX ADMIN — MIDAZOLAM 2 MG: 1 INJECTION INTRAMUSCULAR; INTRAVENOUS at 07:00

## 2017-03-07 RX ADMIN — ASPIRIN 325 MG: 325 TABLET, DELAYED RELEASE ORAL at 17:37

## 2017-03-07 RX ADMIN — HYDROMORPHONE HYDROCHLORIDE 0.25 MG: 1 INJECTION, SOLUTION INTRAMUSCULAR; INTRAVENOUS; SUBCUTANEOUS at 10:57

## 2017-03-07 RX ADMIN — FENTANYL CITRATE 50 MCG: 50 INJECTION INTRAMUSCULAR; INTRAVENOUS at 07:55

## 2017-03-07 RX ADMIN — HYDROCODONE BITARTRATE AND ACETAMINOPHEN 1 TABLET: 7.5; 325 TABLET ORAL at 17:37

## 2017-03-07 RX ADMIN — CEFAZOLIN SODIUM 2 G: 2 INJECTION, SOLUTION INTRAVENOUS at 15:16

## 2017-03-07 RX ADMIN — FENTANYL CITRATE 50 MCG: 50 INJECTION INTRAMUSCULAR; INTRAVENOUS at 10:14

## 2017-03-07 RX ADMIN — FAMOTIDINE 20 MG: 10 INJECTION, SOLUTION INTRAVENOUS at 07:15

## 2017-03-07 RX ADMIN — FENTANYL CITRATE 50 MCG: 50 INJECTION INTRAMUSCULAR; INTRAVENOUS at 10:36

## 2017-03-07 RX ADMIN — FENTANYL CITRATE 50 MCG: 50 INJECTION INTRAMUSCULAR; INTRAVENOUS at 08:13

## 2017-03-07 RX ADMIN — HYDROMORPHONE HYDROCHLORIDE 0.5 MG: 2 INJECTION, SOLUTION INTRAMUSCULAR; INTRAVENOUS; SUBCUTANEOUS at 09:06

## 2017-03-07 RX ADMIN — ROCURONIUM BROMIDE 30 MG: 10 INJECTION INTRAVENOUS at 07:44

## 2017-03-07 RX ADMIN — INSULIN ASPART 3 UNITS: 100 INJECTION, SOLUTION INTRAVENOUS; SUBCUTANEOUS at 21:41

## 2017-03-07 RX ADMIN — SODIUM CHLORIDE, POTASSIUM CHLORIDE, SODIUM LACTATE AND CALCIUM CHLORIDE: 600; 310; 30; 20 INJECTION, SOLUTION INTRAVENOUS at 09:19

## 2017-03-07 RX ADMIN — CEFAZOLIN SODIUM 2 G: 2 INJECTION, SOLUTION INTRAVENOUS at 23:55

## 2017-03-07 RX ADMIN — HYDROCODONE BITARTRATE AND ACETAMINOPHEN 1 TABLET: 7.5; 325 TABLET ORAL at 21:41

## 2017-03-07 RX ADMIN — DOCUSATE SODIUM 100 MG: 100 CAPSULE, LIQUID FILLED ORAL at 17:37

## 2017-03-07 RX ADMIN — LIDOCAINE HYDROCHLORIDE 100 MG: 20 INJECTION, SOLUTION INFILTRATION; PERINEURAL at 07:31

## 2017-03-07 RX ADMIN — TRANEXAMIC ACID 1000 MG: 100 INJECTION, SOLUTION INTRAVENOUS at 09:05

## 2017-03-07 RX ADMIN — DEXAMETHASONE SODIUM PHOSPHATE 8 MG: 10 INJECTION INTRAMUSCULAR; INTRAVENOUS at 08:01

## 2017-03-07 RX ADMIN — GLYCOPYRROLATE 0.5 MG: 0.2 INJECTION INTRAMUSCULAR; INTRAVENOUS at 09:21

## 2017-03-07 RX ADMIN — MIDAZOLAM HYDROCHLORIDE 2 MG: 1 INJECTION, SOLUTION INTRAMUSCULAR; INTRAVENOUS at 07:24

## 2017-03-07 RX ADMIN — NEOSTIGMINE METHYLSULFATE 3 MG: 1 INJECTION INTRAVENOUS at 09:21

## 2017-03-07 RX ADMIN — HYDROMORPHONE HYDROCHLORIDE 0.5 MG: 2 INJECTION, SOLUTION INTRAMUSCULAR; INTRAVENOUS; SUBCUTANEOUS at 08:28

## 2017-03-07 RX ADMIN — TERAZOSIN HYDROCHLORIDE 2 MG: 2 CAPSULE ORAL at 20:53

## 2017-03-07 RX ADMIN — HYDROCODONE BITARTRATE AND ACETAMINOPHEN 1 TABLET: 7.5; 325 TABLET ORAL at 13:33

## 2017-03-07 RX ADMIN — PROPOFOL 150 MG: 10 INJECTION, EMULSION INTRAVENOUS at 07:31

## 2017-03-07 RX ADMIN — SODIUM CHLORIDE, POTASSIUM CHLORIDE, SODIUM LACTATE AND CALCIUM CHLORIDE: 600; 310; 30; 20 INJECTION, SOLUTION INTRAVENOUS at 07:22

## 2017-03-07 RX ADMIN — ROPIVACAINE HYDROCHLORIDE 25 ML: 5 INJECTION, SOLUTION EPIDURAL; INFILTRATION; PERINEURAL at 06:45

## 2017-03-07 RX ADMIN — FENTANYL CITRATE 50 MCG: 50 INJECTION INTRAMUSCULAR; INTRAVENOUS at 07:01

## 2017-03-07 RX ADMIN — KETOROLAC TROMETHAMINE 30 MG: 30 INJECTION, SOLUTION INTRAMUSCULAR; INTRAVENOUS at 09:30

## 2017-03-07 RX ADMIN — GABAPENTIN 300 MG: 300 CAPSULE ORAL at 15:16

## 2017-03-07 RX ADMIN — OXYCODONE HYDROCHLORIDE 10 MG: 10 TABLET, FILM COATED, EXTENDED RELEASE ORAL at 06:48

## 2017-03-07 RX ADMIN — ONDANSETRON 4 MG: 2 INJECTION INTRAMUSCULAR; INTRAVENOUS at 09:30

## 2017-03-07 RX ADMIN — FENTANYL CITRATE 100 MCG: 50 INJECTION INTRAMUSCULAR; INTRAVENOUS at 07:27

## 2017-03-07 NOTE — ANESTHESIA POSTPROCEDURE EVALUATION
Patient: Nixon Chambers    Procedure Summary     Date Anesthesia Start Anesthesia Stop Room / Location    03/07/17 0722 0948  [facility] /  [facility]       Procedure Diagnosis Surgeon Provider    LT TOTAL KNEE ARTHROPLASTY (Left Knee) Osteoarthritis of left knee MD Denis Elena MD          Anesthesia Type: general  Last vitals  /78 (03/07/17 1130)    Temp      Pulse 77 (03/07/17 1130)   Resp 16 (03/07/17 1130)    SpO2 100 % (03/07/17 1130)      Post Anesthesia Care and Evaluation    Patient location during evaluation: PACU  Patient participation: complete - patient participated  Level of consciousness: awake and alert  Pain management: adequate  Airway patency: patent  Anesthetic complications: No anesthetic complications    Cardiovascular status: acceptable  Respiratory status: acceptable  Hydration status: acceptable

## 2017-03-07 NOTE — ANESTHESIA PROCEDURE NOTES
Airway  Urgency: elective    Date/Time: 3/7/2017 7:32 AM  Airway not difficult    General Information and Staff    Patient location during procedure: OR  Anesthesiologist: WADE GRANDA  CRNA: JEY SHAFER    Indications and Patient Condition  Indications for airway management: airway protection    Preoxygenated: yes  MILS not maintained throughout  Mask difficulty assessment: 0 - not attempted    Final Airway Details  Final airway type: supraglottic airway      Successful airway: unique  Size 5    Number of attempts at approach: 1    Additional Comments  LMA inserted with ease, assist to SV

## 2017-03-07 NOTE — ANESTHESIA PROCEDURE NOTES
Peripheral Block    Patient location during procedure: holding area  Reason for block: at surgeon's request and post-op pain management  Performed by  Anesthesiologist: WADE GRANDA  Preanesthetic Checklist  Completed: patient identified, site marked, surgical consent, pre-op evaluation, timeout performed, IV checked, risks and benefits discussed and monitors and equipment checked  Peripheral Block Prep:  Sterile barriers:gloves and sterile barriers  Prep: ChloraPrep  Patient monitoring: continuous pulse oximetry, EKG and blood pressure monitoring  Peripheral Procedure  Sedation:yes  Guidance:ultrasound guided, nerve stimulator and landmark technique  Images:still images obtained    Laterality:left  Block Type:adductor canal block  Injection Technique:single-shot  Needle Type:short-bevel  Needle Gauge:22 G    Medications  Local Injected:ropivacaine 0.5% Local Amount Injected:25mL  Post Assessment  Injection Assessment: negative aspiration for heme, no paresthesia on injection and incremental injection  Patient Tolerance:comfortable throughout block  Complications:no

## 2017-03-07 NOTE — PLAN OF CARE
Problem: Inpatient Physical Therapy  Goal: Gait Training Goal LTG- PT    03/07/17 1601   Gait Training PT LTG   Gait Training Goal PT LTG, Time to Achieve 5 days   Gait Training Goal PT LTG, [place] Level supervision required   Gait Training Goal PT LTG, Assist Device walker, rolling   Gait Training Goal PT LTG, Distance to Achieve 80       Goal: Strength Goal LTG- PT  Outcome: Unable to achieve outcome(s) by discharge Date Met:  03/07/17  Goal: Range of Motion Goal LTG- PT    03/07/17 1601   Range of Motion PT LTG   Range of Motion Goal PT LTG, Time to Achieve 5 days   Range fo Motion Goal PT LTG, Joint L knee   Range of Motion Goal PT LTG, AROM Measure -5-90       Goal: Patient Education Goal LTG- PT    03/07/17 1601   Patient Education PT LTG   Patient Education PT LTG, Time to Achieve 5 days   Patient Education PT LTG, Education Type HEP;written program   Patient Education PT LTG, Education Understanding demonstrate adequately;verbalize understanding

## 2017-03-07 NOTE — BRIEF OP NOTE
TOTAL KNEE ARTHROPLASTY  Procedure Note    Nixon Chambers  3/7/2017    Pre-op Diagnosis:   Osteoarthritis of left knee [961093]  Post-op Diagnosis:     Post-Op Diagnosis Codes:     * Osteoarthritis of left knee [M17.9]    Procedure/CPT® Codes:      Procedure(s):  LT TOTAL KNEE ARTHROPLASTY    Surgeon(s):  Jovana Blackwood MD    Anesthesia: General with Block    Staff:   Circulator: Albert Ashford RN; Jasson Saenz RN  Scrub Person: Bryce Weeks  Assistant: Willow Murray  Orientee: Sathya Argueta RN    Estimated Blood Loss: 100 mL  Urine Voided: * No values recorded between 3/7/2017  7:21 AM and 3/7/2017  9:34 AM *    Specimens:                * No specimens in log *      Drains:           Findings: see dict     Complications: ceci Blackwood MD     Date: 3/7/2017  Time: 9:34 AM

## 2017-03-07 NOTE — OP NOTE
Patient: Nixon Chambers  YOB: 1950  Medical Record Number: [id number]  Attending Physician: Jovana Blackwood, *  Primary Care Physician: Matt Yan Jr., MD    Date of Service: 3/7/2017    Surgeon: Jovana Blackwood MD        DATE OF PROCEDURE: 3/7/2017    PREOPERATIVE DIAGNOSIS:  Osteoarthritis of left knee [M17.9]    POSTOPERATIVE DIAGNOSIS: Osteoarthritis of left knee [M17.9]    PROCEDURE PERFORMED: [place] TOTAL KNEE ARTHROPLASTY [28789] (LT TOTAL KNEE ARTHROPLASTY) by utilizing a  Medial parapatellar approach with Depuy ATTUNE  tibial base Fixed Bearing size # 7, a posterior stabilized femoral component size # 7, tibial insert posterior stabilized size # 7, 7 mm thick and a patella medialized dome  size # 35.   The tibia , femur and the patella were cemented in utilizing Simplex tobramycin cement.     SURGEON: Jovana Blackwood MD     ASSISTANT: Berna Ng Certified Surgical First assist.                                 Quintin Day MD, Fellow  The services of a skilled  first assist were necessary for performing the procedure safely and expeditiously.  The first assist was present for the entire duration of the case and helped with positioning, retraction and closure of the incision.      ANESTHESIA: General with Block  Anesthesiologist: Denis Martin MD  CRNA: Mojgan Mix CRNA     General anaesthesia with a regional block adductor canal and intraoperative periarticular  Exparel + naropin injection.    ESTIMATED BLOOD LOSS: 100 mL    SPECIMENS:   Order Name Source Comment Collection Info Order Time   HEMOGLOBIN A1C Arm, Left  Collected By: Riya Burns RN 3/7/2017  6:49 AM   TYPE AND SCREEN   Collected By: Riya Burns RN 3/7/2017  5:54 AM       COMPLICATIONS: Nil.     DRAINS: Nil          INDICATIONS: The patient is a 66 y.o. male  who is known to me from progressively  worsening  knee pain  over the past several months. The patient has  reached the point of disability and is having difficulty with activities of daily living including, but not limited to: difficulty getting up from a chair, difficulty ambulating distances, difficulty with stairs, and complains of night pain . Having failed nonoperative management, treatment options and alternatives were discussed in detail with the patient who is indicated for a total knee arthroplasty. Likely risks and benefits of the procedure, including but not limited to infection, DVT, pulmonary embolism, future loosening of the implants, possibility of injury to tendons, ligaments, nerves or vessels and periprosthetic fractures have been discussed in detail. Despite the risks involved, the patient elected to proceed and informed consent was obtained and the patient was scheduled for surgery. The patient was seen in the preoperative holding area and the operative site was marked.   He has a history of right total knee arthroplasty by Dr Judy Nicolas several years back.     DESCRIPTION OF PROCEDURE:   The patient was transferred to [facility] operating room. Preoperative antibiotics in the form of Kefzol  intravenously was infused prior to the incision and prior to the tourniquet placement according to the SCIP protocol. A surgical time out was done with the team and the correct patient, surgical side and site were identified. After achieving adequate general anesthesia, a well-padded tourniquet was placed over the proximal aspect of the operative thigh. The operative leg was prepped and draped in the usual sterile fashion. Tourniquet was elevated to a pressure of 250 mmHg.     A skin incision was made vertically oriented centering over the patella anteriorly. Skin and subcutaneous tissue were incised and a medial parapatellar approach was developed. There was a large effusion. The patella was subluxed and the knee was inspected. There was complete loss of articular cartilage on the medial distal  femoral condyle with corresponding areas of loss of articular cartilage in the medial tibial plateau. There were osteophytes in the notch and also medial tibia. There were extensive osteophytes around the patella and there was complete loss of articular cartilage in the trochlear groove of the femur and also corresponding areas of the patella. The patient had about 20 degrees of varus and 10 degrees of fixed flexion contracture. A distal femoral cut was completed utilizing an intramedullary alignment fiona resecting 11 mm of distal femur with 5 degrees of valgus. The distal femoral cut was found to be satisfactory.     Attention was then directed to the proximal tibia. Extramedullary alignment fiona was utilized and medial tibial thickness of 2 mm stylus was utilized amounting to 13 mm on the lateral side. The proximal tibial cut was made with  5° posterior slope and neutral varus valgus. The proximal tibial cut was found to be satisfactory.     Attention was again directed to the distal femur. The distal femur was sized maintaining a posterior referencing. This was done along with verification of  [place]’s line and epicondylar axis maintaining correct rotational alignment and a 4-in-1 cut was completed for the distal femur utilizing a size # 7 cutting block. Anterior, posterior and chamfer cuts were completed. Followed by this, it was planned to proceed with sacrificing the PCL. The box cut was completed. PCL was resected. Posterior osteophytes were resected from the distal femur. Medial and lateral meniscectomy was completed. Proximal tibia was sized and a trial reduction was performed. Reduction was found to be satisfactory with range of motion from 0° to 120° with the patella tracking well.     Attention was then directed to the patella. The patella measured 24 mm in thickness; 9 mm  of patella was resected and lug holes were drilled for a trial   patella  35 mm diameter. The trial button was found to be seated  satisfactorily.    Having been satisfied with the trials, the bony surfaces were prepared for cementation after punching and preparing for the keel of the proximal tibia, maintaining the correct rotational alignment.  Exparel was infiltrated into the posterior capsule medially and laterally. Followed by this, the tibial component was cemented into position followed by the femoral component, followed by patella and  seating of the  trial liner. Two batches of Tobramycin Simplex cement was utilized ,  Excess cement was removed. The components were held without any movement. The rest of the Exparel was infiltrated into the periarticular tissue.     Again, range of motion was checked and the range was satisfactory from 0° to 120° with excellent stability throughout the range of motion. Good medial and lateral tissue tension, and soft tissue balancing. The patella was tracking well. The trial liner was replaced with a Posterior stabilized size 7, 7  mm thick liner. Having been satisfied with this, the joint was thoroughly irrigated with saline. Soft tissue hemostasis was secured.      Trannexamic acid was given intravenously prior to release of the tourniquet. Arthrotomy was closed with Ethibond sutures followed by closure of the incision in layers with Vicryl sutures and staples. Sterile dressings were placed and the patient was transferred to the recovery room in stable condition. The patient was given a knee immobilizer. The patient tolerated the procedure well and is being admitted for postoperative antibiotics according to the SCIP protocol for 2 more doses in the first twenty four hours. Also,  ASA daily will be started  on postoperative day # 1. The patient will be mobilized in am with physical therapy     Jovana Blackwood M.D.    3/7/2017    CC: Matt Yan Jr., MD; MD Jovana Champion, *    EMR Dragon/Transcription disclaimer:   Much of this encounter note is an  electronic transcription/translation of spoken language to printed text. The electronic translation of spoken language may permit erroneous, or at times, nonsensical words or phrases to be inadvertently transcribed; Although I have reviewed the note for such errors, some may still exist.

## 2017-03-07 NOTE — ANESTHESIA PREPROCEDURE EVALUATION
Anesthesia Evaluation        Airway   possible difficult intubation and anterior  Dental      Pulmonary    (+) sleep apnea on CPAP,   Cardiovascular     (+) hypertension,       Neuro/Psych  GI/Hepatic/Renal/Endo    (+)  diabetes mellitus,     Musculoskeletal     Abdominal    Substance History      OB/GYN          Other                                    Anesthesia Plan    ASA 3     general     Anesthetic plan and risks discussed with patient.

## 2017-03-07 NOTE — PLAN OF CARE
Problem: Patient Care Overview (Adult)  Goal: Plan of Care Review    03/07/17 1602   Coping/Psychosocial Response Interventions   Plan Of Care Reviewed With patient;spouse   Outcome Evaluation   Outcome Summary/Follow up Plan patient doing very well with tjr protocol. pt was indep with slr today. ready for home as early as tomm. will follow closely and plan for gym in am

## 2017-03-08 PROBLEM — I10 HYPERTENSION: Status: ACTIVE | Noted: 2017-03-08

## 2017-03-08 PROBLEM — E11.9 TYPE 2 DIABETES MELLITUS (HCC): Status: ACTIVE | Noted: 2017-03-08

## 2017-03-08 PROBLEM — M17.12 PRIMARY OSTEOARTHRITIS OF LEFT KNEE: Status: RESOLVED | Noted: 2017-03-07 | Resolved: 2017-03-08

## 2017-03-08 LAB
ANION GAP SERPL CALCULATED.3IONS-SCNC: 13.1 MMOL/L
BASOPHILS # BLD AUTO: 0 10*3/MM3 (ref 0–0.2)
BASOPHILS NFR BLD AUTO: 0 % (ref 0–1.5)
BUN BLD-MCNC: 21 MG/DL (ref 8–23)
BUN/CREAT SERPL: 22.3 (ref 7–25)
CALCIUM SPEC-SCNC: 8.5 MG/DL (ref 8.6–10.5)
CHLORIDE SERPL-SCNC: 99 MMOL/L (ref 98–107)
CO2 SERPL-SCNC: 23.9 MMOL/L (ref 22–29)
CREAT BLD-MCNC: 0.94 MG/DL (ref 0.76–1.27)
DEPRECATED RDW RBC AUTO: 42.9 FL (ref 37–54)
EOSINOPHIL # BLD AUTO: 0 10*3/MM3 (ref 0–0.7)
EOSINOPHIL NFR BLD AUTO: 0 % (ref 0.3–6.2)
ERYTHROCYTE [DISTWIDTH] IN BLOOD BY AUTOMATED COUNT: 12.7 % (ref 11.5–14.5)
GFR SERPL CREATININE-BSD FRML MDRD: 81 ML/MIN/1.73
GLUCOSE BLD-MCNC: 183 MG/DL (ref 65–99)
GLUCOSE BLDC GLUCOMTR-MCNC: 151 MG/DL (ref 70–130)
GLUCOSE BLDC GLUCOMTR-MCNC: 153 MG/DL (ref 70–130)
GLUCOSE BLDC GLUCOMTR-MCNC: 183 MG/DL (ref 70–130)
GLUCOSE BLDC GLUCOMTR-MCNC: 188 MG/DL (ref 70–130)
HCT VFR BLD AUTO: 36.3 % (ref 40.4–52.2)
HGB BLD-MCNC: 12.5 G/DL (ref 13.7–17.6)
IMM GRANULOCYTES # BLD: 0.05 10*3/MM3 (ref 0–0.03)
IMM GRANULOCYTES NFR BLD: 0.3 % (ref 0–0.5)
LYMPHOCYTES # BLD AUTO: 0.9 10*3/MM3 (ref 0.9–4.8)
LYMPHOCYTES NFR BLD AUTO: 5.4 % (ref 19.6–45.3)
MCH RBC QN AUTO: 31.7 PG (ref 27–32.7)
MCHC RBC AUTO-ENTMCNC: 34.4 G/DL (ref 32.6–36.4)
MCV RBC AUTO: 92.1 FL (ref 79.8–96.2)
MONOCYTES # BLD AUTO: 1.65 10*3/MM3 (ref 0.2–1.2)
MONOCYTES NFR BLD AUTO: 9.9 % (ref 5–12)
NEUTROPHILS # BLD AUTO: 14 10*3/MM3 (ref 1.9–8.1)
NEUTROPHILS NFR BLD AUTO: 84.4 % (ref 42.7–76)
PLATELET # BLD AUTO: 168 10*3/MM3 (ref 140–500)
PMV BLD AUTO: 11.1 FL (ref 6–12)
POTASSIUM BLD-SCNC: 3.8 MMOL/L (ref 3.5–5.2)
RBC # BLD AUTO: 3.94 10*6/MM3 (ref 4.6–6)
SODIUM BLD-SCNC: 136 MMOL/L (ref 136–145)
WBC NRBC COR # BLD: 16.6 10*3/MM3 (ref 4.5–10.7)

## 2017-03-08 PROCEDURE — 97150 GROUP THERAPEUTIC PROCEDURES: CPT

## 2017-03-08 PROCEDURE — 82962 GLUCOSE BLOOD TEST: CPT

## 2017-03-08 PROCEDURE — 97110 THERAPEUTIC EXERCISES: CPT

## 2017-03-08 PROCEDURE — 85025 COMPLETE CBC W/AUTO DIFF WBC: CPT | Performed by: ORTHOPAEDIC SURGERY

## 2017-03-08 PROCEDURE — 80048 BASIC METABOLIC PNL TOTAL CA: CPT | Performed by: ORTHOPAEDIC SURGERY

## 2017-03-08 PROCEDURE — 94799 UNLISTED PULMONARY SVC/PX: CPT

## 2017-03-08 PROCEDURE — 25010000002 ONDANSETRON PER 1 MG: Performed by: ORTHOPAEDIC SURGERY

## 2017-03-08 PROCEDURE — 25010000002 MORPHINE PER 10 MG: Performed by: ORTHOPAEDIC SURGERY

## 2017-03-08 PROCEDURE — 63710000001 INSULIN ASPART PER 5 UNITS: Performed by: HOSPITALIST

## 2017-03-08 RX ORDER — HYDROCODONE BITARTRATE AND ACETAMINOPHEN 7.5; 325 MG/1; MG/1
1 TABLET ORAL EVERY 4 HOURS PRN
Qty: 72 TABLET | Refills: 0 | Status: SHIPPED | OUTPATIENT
Start: 2017-03-08 | End: 2017-10-19 | Stop reason: ALTCHOICE

## 2017-03-08 RX ORDER — CELECOXIB 200 MG/1
200 CAPSULE ORAL DAILY
Qty: 30 CAPSULE | Refills: 0 | Status: SHIPPED | OUTPATIENT
Start: 2017-03-08 | End: 2017-10-19 | Stop reason: ALTCHOICE

## 2017-03-08 RX ADMIN — HYDROCODONE BITARTRATE AND ACETAMINOPHEN 1 TABLET: 7.5; 325 TABLET ORAL at 13:05

## 2017-03-08 RX ADMIN — HYDROCODONE BITARTRATE AND ACETAMINOPHEN 1 TABLET: 7.5; 325 TABLET ORAL at 21:40

## 2017-03-08 RX ADMIN — GABAPENTIN 300 MG: 300 CAPSULE ORAL at 20:08

## 2017-03-08 RX ADMIN — MORPHINE SULFATE 4 MG: 4 INJECTION, SOLUTION INTRAMUSCULAR; INTRAVENOUS at 23:07

## 2017-03-08 RX ADMIN — TERAZOSIN HYDROCHLORIDE 2 MG: 2 CAPSULE ORAL at 20:08

## 2017-03-08 RX ADMIN — CELECOXIB 200 MG: 200 CAPSULE ORAL at 08:05

## 2017-03-08 RX ADMIN — ASPIRIN 325 MG: 325 TABLET, DELAYED RELEASE ORAL at 17:13

## 2017-03-08 RX ADMIN — DOCUSATE SODIUM 100 MG: 100 CAPSULE, LIQUID FILLED ORAL at 08:05

## 2017-03-08 RX ADMIN — DOCUSATE SODIUM 100 MG: 100 CAPSULE, LIQUID FILLED ORAL at 17:13

## 2017-03-08 RX ADMIN — INSULIN ASPART 2 UNITS: 100 INJECTION, SOLUTION INTRAVENOUS; SUBCUTANEOUS at 11:56

## 2017-03-08 RX ADMIN — HYDROCODONE BITARTRATE AND ACETAMINOPHEN 1 TABLET: 7.5; 325 TABLET ORAL at 01:48

## 2017-03-08 RX ADMIN — INSULIN ASPART 2 UNITS: 100 INJECTION, SOLUTION INTRAVENOUS; SUBCUTANEOUS at 21:41

## 2017-03-08 RX ADMIN — HYDROCODONE BITARTRATE AND ACETAMINOPHEN 1 TABLET: 7.5; 325 TABLET ORAL at 09:18

## 2017-03-08 RX ADMIN — ASPIRIN 325 MG: 325 TABLET, DELAYED RELEASE ORAL at 08:05

## 2017-03-08 RX ADMIN — HYDROCODONE BITARTRATE AND ACETAMINOPHEN 1 TABLET: 7.5; 325 TABLET ORAL at 17:13

## 2017-03-08 RX ADMIN — ONDANSETRON 4 MG: 2 INJECTION INTRAMUSCULAR; INTRAVENOUS at 23:06

## 2017-03-08 RX ADMIN — INSULIN ASPART 2 UNITS: 100 INJECTION, SOLUTION INTRAVENOUS; SUBCUTANEOUS at 08:05

## 2017-03-08 RX ADMIN — HYDROCODONE BITARTRATE AND ACETAMINOPHEN 1 TABLET: 7.5; 325 TABLET ORAL at 05:46

## 2017-03-08 RX ADMIN — INSULIN ASPART 2 UNITS: 100 INJECTION, SOLUTION INTRAVENOUS; SUBCUTANEOUS at 17:13

## 2017-03-08 NOTE — PLAN OF CARE
"Problem: Patient Care Overview (Adult)  Goal: Plan of Care Review  Outcome: Ongoing (interventions implemented as appropriate)    03/08/17 1838   Coping/Psychosocial Response Interventions   Plan Of Care Reviewed With patient   Outcome Evaluation   Outcome Summary/Follow up Plan Patient ambulating well with walker and stand by assist. Patient having good pain control with po meds, requiring them Q4H. Vital signs are stable and voiding function intact. Patient feels like he would benefit from a few more PT sessions before d/c.   Patient Care Overview   Progress improving       Goal: Adult Individualization and Mutuality  Outcome: Ongoing (interventions implemented as appropriate)    03/07/17 0716 03/08/17 0353   Individualization   Patient Specific Preferences likes to be called \"[place]\" --    Patient Specific Goals --  pain control, mobility    Patient Specific Interventions --  give pain med in timely manner, assist with ambulation        Goal: Discharge Needs Assessment  Outcome: Ongoing (interventions implemented as appropriate)    03/08/17 1108 03/08/17 1838   Discharge Needs Assessment   Concerns To Be Addressed --  basic needs concerns   Discharge Facility/Level Of Care Needs --  home with home health   Living Environment   Transportation Available car;family or friend will provide --          Problem: Perioperative Period (Adult)  Goal: Signs and Symptoms of Listed Potential Problems Will be Absent or Manageable (Perioperative Period)  Outcome: Outcome(s) achieved Date Met:  03/08/17 03/08/17 1838   Perioperative Period   Problems Assessed (Perioperative Period) all   Problems Present (Perioperative Period) pain         Problem: Fall Risk (Adult)  Goal: Absence of Falls  Outcome: Ongoing (interventions implemented as appropriate)    03/08/17 1838   Fall Risk (Adult)   Absence of Falls achieves outcome           "

## 2017-03-08 NOTE — PROGRESS NOTES
[facility]     LOS: 1 day     Subjective:  Eating some    No soa  No cp      Objective:    Vital Signs:  Temp:  [96.9 °F (36.1 °C)-98.7 °F (37.1 °C)] 97 °F (36.1 °C)  Heart Rate:  [70-78] 78  Resp:  [16] 16  BP: (102-113)/(60-69) 104/60    General Appearance: no acute distress, appears comfortable  Heart: regular rate and rhythm  Lungs: clear to auscultation bilaterally, respirations unlabored, good air entry  Abdomen: soft, non-tender, no guarding, no rebound, non-distended  Extremeties: no edema, no cyanosis  Neurology: speech normal, mental status normal  Mental Status: alert, oriented    Results Review:    GLUCOSE   Date Value Ref Range Status   03/08/2017 183 (H) 65 - 99 mg/dL Final       Results from last 7 days  Lab Units 03/08/17  0454   WBC 10*3/mm3 16.60*   HEMOGLOBIN g/dL 12.5*   HEMATOCRIT % 36.3*   PLATELETS 10*3/mm3 168       Results from last 7 days  Lab Units 03/08/17  0454   SODIUM mmol/L 136   POTASSIUM mmol/L 3.8   CHLORIDE mmol/L 99   TOTAL CO2 mmol/L 23.9   BUN mg/dL 21   CREATININE mg/dL 0.94   CALCIUM mg/dL 8.5*   GLUCOSE mg/dL 183*                     I have reviewed daily medications and changes in CPOE    Scheduled meds    aspirin 325 mg Oral BID   celecoxib 200 mg Oral Daily   docusate sodium 100 mg Oral BID   gabapentin 300 mg Oral Daily   insulin aspart 0-7 Units Subcutaneous 4x Daily AC & at Bedtime   lisinopril 20 mg Oral Q24H   terazosin 2 mg Oral Nightly          PRN meds  •  acetaminophen  •  bisacodyl  •  dextrose  •  dextrose  •  glucagon (human recombinant)  •  HYDROcodone-acetaminophen  •  Morphine **AND** naloxone  •  ondansetron    Assessment:    Active Problems:    Osteoarthritis of knee    Type 2 diabetes mellitus    Hypertension      Plan:  SSi    Holding bp meds  As bp low, d/w patient    Sathya Carroll MD  03/08/17  5:55 PM

## 2017-03-08 NOTE — PLAN OF CARE
Problem: Patient Care Overview (Adult)  Goal: Plan of Care Review    03/08/17 1200   Coping/Psychosocial Response Interventions   Plan Of Care Reviewed With patient   Outcome Evaluation   Outcome Summary/Follow up Plan Improved tolerance to functional activity this AM with an increase in gait distance, progression of ther. ex. protocol, and decreased assist required for overall functional mobility.   Patient Care Overview   Progress improving

## 2017-03-08 NOTE — PLAN OF CARE
Problem: Patient Care Overview (Adult)  Goal: Plan of Care Review  Outcome: Ongoing (interventions implemented as appropriate)    03/08/17 0353   Coping/Psychosocial Response Interventions   Plan Of Care Reviewed With patient   Outcome Evaluation   Outcome Summary/Follow up Plan VSS. Pain controlled with PO pain med. Pt up to chair on this shift. Pt walked on unit with assitst x1 and walker        Goal: Adult Individualization and Mutuality  Outcome: Ongoing (interventions implemented as appropriate)  Goal: Discharge Needs Assessment  Outcome: Ongoing (interventions implemented as appropriate)    Problem: Perioperative Period (Adult)  Goal: Signs and Symptoms of Listed Potential Problems Will be Absent or Manageable (Perioperative Period)  Outcome: Ongoing (interventions implemented as appropriate)    Problem: Fall Risk (Adult)  Goal: Identify Related Risk Factors and Signs and Symptoms  Outcome: Outcome(s) achieved Date Met:  03/08/17  Goal: Absence of Falls  Outcome: Ongoing (interventions implemented as appropriate)

## 2017-03-08 NOTE — PLAN OF CARE
Problem: Knee Replacement, Total (Adult)  Goal: Signs and Symptoms of Listed Potential Problems Will be Absent or Manageable (Knee Replacement, Total)  Outcome: Ongoing (interventions implemented as appropriate)    03/08/17 1841   Knee Replacement, Total   Problems Assessed (Total Knee Replacement) all   Problems Present (Total Knee Replacement) pain;decreased range of motion

## 2017-03-08 NOTE — CONSULTS
Inpatient Consult to Hospitalist  Consult performed by: ALCIRA ROGERS  Consult ordered by: BARRINGTON LEBLANC          Patient Care Team:  Matt Yan Jr., MD as PCP - General (Family Medicine)    Chief complaint:s/p left knee    Subjective     History of Present Illness  66-year-old gentleman who has a history of type 2 diabetes high blood pressure and melanoma who comes in the hospital because of worsening left knee pain despite conservative measures including injections.  Patient was told that he would know when it was time to have his left knee done and he says it's gotten to the point that it's time.  Patient's undergone total knee arthroplasty morning postoperatively the patient's doing quite well.  No chest pain or shortness of air no fevers chills.    Were asked to see the patient for his diabetes and hypertension.  He denies any numbness tingling in his hands feet eyes any retinopathy or neuropathy related to his diabetes.  His hemoglobin A1c's 6.2.  He is on metformin at home for this.      The patient is extremely compliant and actually has set an alarm to remind him to do his incentive spirometry each hour.        Review of Systems   Constitutional: Negative for activity change and appetite change.   HENT: Negative for dental problem, postnasal drip and rhinorrhea.    Respiratory: Negative for apnea and shortness of breath.    Cardiovascular: Negative for chest pain and palpitations.   Gastrointestinal: Negative for abdominal distention and abdominal pain.   Endocrine: Negative for cold intolerance and polydipsia.   Genitourinary: Negative for difficulty urinating and dysuria.   Musculoskeletal: Negative for arthralgias.   Neurological: Negative for dizziness and numbness.   Hematological: Negative for adenopathy.        Past Medical History   Diagnosis Date   • Cancer 2001     MELANOMA   • Diabetes mellitus    • Hard to intubate      WITH ROTATOR CUFF SURGERY, WAS INTUBATED WHILE AWAKE X1 WITH  MELANOMA SURGERY   • High cholesterol    • Hypertension    • Knee pain, left    • Sleep apnea      WEARS CPAP   ,   Past Surgical History   Procedure Laterality Date   • Knee arthroplasty Right 2000   • Rotator cuff repair Right 1995   • Skin cancer excision       MID AND LOWER BACK   • Skin graft       BACK   , History reviewed. No pertinent family history.,   Social History   Substance Use Topics   • Smoking status: Never Smoker   • Smokeless tobacco: Never Used   • Alcohol use No   ,   Prescriptions Prior to Admission   Medication Sig Dispense Refill Last Dose   • aspirin 81 MG chewable tablet Chew 81 mg Daily.   2/20/2017   • atorvastatin (LIPITOR) 20 MG tablet Take 20 mg by mouth Daily.   3/6/2017 at 0600   • B Complex Vitamins (VITAMIN B COMPLEX) capsule capsule Take 1 capsule by mouth Daily.   3/1/2017   • doxazosin (CARDURA) 2 MG tablet Take 2 mg by mouth Every Night.   3/6/2017 at 2300   • gabapentin (NEURONTIN) 300 MG capsule Take 300 mg by mouth Daily.   3/6/2017 at 0600   • lisinopril-hydrochlorothiazide (PRINZIDE,ZESTORETIC) 20-12.5 MG per tablet Take 1 tablet by mouth Daily.   3/6/2017 at 0600   • meloxicam (MOBIC) 15 MG tablet Take 15 mg by mouth Daily.   2/28/2017   • metFORMIN (GLUCOPHAGE) 500 MG tablet Take 500 mg by mouth 2 (Two) Times a Day With Meals.   3/6/2017 at 1200   • Omega-3 Fatty Acids (FISH OIL) 1000 MG capsule capsule Take 1,000 mg by mouth Daily With Breakfast.   2/28/2017   • Zinc 50 MG tablet Take 1 tablet by mouth Daily.   2/28/2017   , Scheduled Meds:    aspirin 325 mg Oral BID   ceFAZolin 2 g Intravenous Q8H   [START ON 3/8/2017] celecoxib 200 mg Oral Daily   docusate sodium 100 mg Oral BID   gabapentin 300 mg Oral Daily   lisinopril-hydrochlorothiazide 1 tablet Oral Daily   terazosin 2 mg Oral Nightly   , Continuous Infusions:    sodium chloride 100 mL/hr   , PRN Meds:  •  acetaminophen  •  bisacodyl  •  HYDROcodone-acetaminophen  •  Morphine **AND** naloxone  •  ondansetron and  Allergies:  Review of patient's allergies indicates no known allergies.    Objective      Vital Signs  Temp:  [97.8 °F (36.6 °C)-98.1 °F (36.7 °C)] 98.1 °F (36.7 °C)  Heart Rate:  [61-83] 73  Resp:  [12-16] 14  BP: (115-147)/(65-96) 116/65    Physical Exam   Constitutional: He appears well-developed and well-nourished. No distress.   HENT:   Head: Normocephalic and atraumatic.   Nose: Nose normal.   Mouth/Throat: Oropharynx is clear and moist. No oropharyngeal exudate.   Eyes: Conjunctivae and EOM are normal. No scleral icterus.   Neck: No JVD present. No tracheal deviation present. No thyromegaly present.   Cardiovascular: Normal rate, regular rhythm and normal heart sounds.  Exam reveals no gallop and no friction rub.    No murmur heard.  Pulmonary/Chest: Effort normal and breath sounds normal. No stridor. No respiratory distress. He has no wheezes. He has no rales.   Abdominal: Soft. Bowel sounds are normal. He exhibits no distension and no mass. There is no tenderness. There is no rebound and no guarding.   Musculoskeletal: He exhibits no edema, tenderness or deformity.   Lymphadenopathy:     He has no cervical adenopathy.   Neurological: He is alert. No cranial nerve deficit.   Skin: Skin is warm and dry. No rash noted. He is not diaphoretic.   Psychiatric: He has a normal mood and affect. His behavior is normal.       Results Review:    I reviewed the patient's new clinical results.  I reviewed the patient's new imaging results and agree with the interpretation.  I personally viewed and interpreted the patient's EKG/Telemetry data        Assessment/Plan     Active Problems:    Primary osteoarthritis of left knee    htn    Assessment & Plan  Monitoring blood sugar    bp is ok    We'll hold the patient's HCTZ.  We will continue with the lisinopril.  Probably hold the HCTZ on discharge unless the patient's blood pressures increasing during the hospitalization.  Written parameters for holding the lisinopril the  morning.  We'll monitor the patient's blood sugars carefully.  We are holding on the metformin.  The patient understands why we are holding on the metformin.    I discussed the patients findings and my recommendations with patient    Sathya Carroll MD  03/07/17  7:02 PM    Time:

## 2017-03-08 NOTE — PROGRESS NOTES
Acute Care - Physical Therapy Treatment Note  [facility]     Patient Name: Nixon Chambers  : 1950  MRN: [id number]  Today's Date: 3/8/2017             Admit Date: 3/7/2017    Visit Dx:    ICD-10-CM ICD-9-CM   1. Primary osteoarthritis of left knee M17.12 715.16     Patient Active Problem List   Diagnosis   (none) - all problems resolved or deleted               Adult Rehabilitation Note       17 1644 17 1158       Rehab Assessment/Intervention    Discipline physical therapy assistant  - physical therapist  -MS     Document Type therapy note (daily note)  - therapy note (daily note)  -MS     Subjective Information agree to therapy;complains of;pain;fatigue  - agree to therapy;complains of;fatigue;pain  -MS     Patient Effort, Rehab Treatment  good  -MS     Symptoms Noted During/After Treatment  fatigue;increased pain  -MS     Precautions/Limitations fall precautions  - fall precautions  -MS     Recorded by [MARQUEZ] Anu Argueta PTA [MS] Richard Andrews, PT     Pain Assessment    Pain Assessment 0-10  - 0-10  -MS     Pain Score 5  -JM 5  -MS     Post Pain Score  5  -MS     Pain Type Surgical pain  - Acute pain;Surgical pain  -MS     Pain Location Knee  - Knee  -MS     Pain Orientation Left  - Left  -MS     Pain Intervention(s) Medication (See MAR);Repositioned;Cold applied  - Medication (See MAR);Cold applied;Repositioned;Elevated;Rest  -MS     Recorded by [MARQUEZ] Anu Argueta PTA [MS] Richard Andrews, PT     Cognitive Assessment/Intervention    Current Cognitive/Communication Assessment functional  - functional  -MS     Orientation Status oriented x 4  -JM oriented x 4  -MS     Follows Commands/Answers Questions  100% of the time  -MS     Personal Safety  WNL/WFL  -MS     Personal Safety Interventions  fall prevention program maintained;gait belt;nonskid shoes/slippers when out of bed;supervised activity  -MS     Recorded by [MARQUEZ] Anu Argueta PTA [MS] Richard LI  Juliana, PT     ROM (Range of Motion)    General ROM Detail  Left knee AROM (-7, 75)  -MS     Recorded by  [MS] Richard Adnrews PT     Bed Mobility, Assessment/Treatment    Bed Mobility, Comment  Up in chair  -MS     Recorded by  [MS] Richard Andrews PT     Transfer Assessment/Treatment    Transfers, Sit-Stand [place] stand by assist  - stand by assist  -MS     Transfers, Stand-Sit [place] stand by assist  - stand by assist  -MS     Transfers, Sit-Stand-Sit, Assist Device rolling walker  - rolling walker  -MS     Recorded by [JM] Anu Argueta PTA [MS] Richard Andrews, PT     Gait Assessment/Treatment    Gait, [place] Level contact guard assist  - contact guard assist  -MS     Gait, Assistive Device rolling walker  - rolling walker  -MS     Gait, Distance (Feet) 120  -JM 90  -MS     Gait, Gait Deviations  left:;antalgic;archie decreased  -MS     Gait, Comment cues for posture  -JM      Recorded by [JM] Anu Argueta PTA [MS] Richard Andrews, PT     Stairs Assessment/Treatment    Number of Stairs  3  -MS     Stairs, Handrail Location  none  -MS     Stairs, [place] Level --   Backwards with use of walker  - contact guard assist   Backwards with use of walker  -MS     Recorded by [JM] Anu Argueat PTA [MS] Richard Andrews, LORENZO     Therapy Exercises    Exercise Protocols total knee  -JM total knee  -MS     Total Knee Exercises left:;15 reps;completed protocol  - left:;15 reps;completed protocol  -MS     Recorded by [JM] Anu Argueta PTA [MS] Richard Andrews, PT     Positioning and Restraints    Pre-Treatment Position sitting in chair/recliner  - sitting in chair/recliner  -MS     Post Treatment Position  chair  -MS     In Chair reclined;call light within reach;encouraged to call for assist;with family/caregiver  - notified nsg;reclined;sitting;call light within reach;encouraged to call for assist   Ice pack to Left knee  -MS     Recorded by [JM] Anu Argueta,  PTA [MS] Richard LI Juliana, PT       User Key  (r) = Recorded By, (t) = Taken By, (c) = Cosigned By    Initials Name Effective Dates    MARQUEZ Argueta, PTA 02/18/16 -     MS Richard Andrews, PT 12/01/15 -                 IP PT Goals       03/07/17 1601          Gait Training PT LTG    Gait Training Goal PT LTG, Time to Achieve 5 days  -RD      Gait Training Goal PT LTG, [place] Level supervision required  -RD      Gait Training Goal PT LTG, Assist Device walker, rolling  -RD      Gait Training Goal PT LTG, Distance to Achieve 80  -RD      Range of Motion PT LTG    Range of Motion Goal PT LTG, Time to Achieve 5 days  -RD      Range fo Motion Goal PT LTG, Joint L knee  -RD      Range of Motion Goal PT LTG, AROM Measure -5-90  -RD      Patient Education PT LTG    Patient Education PT LTG, Time to Achieve 5 days  -RD      Patient Education PT LTG, Education Type HEP;written program  -RD      Patient Education PT LTG, Education Understanding demonstrate adequately;verbalize understanding  -RD        User Key  (r) = Recorded By, (t) = Taken By, (c) = Cosigned By    Initials Name Provider Type    REESE Junior, PT Physical Therapist          Physical Therapy Education     Title: PT OT SLP Therapies (Done)     Topic: Physical Therapy (Done)     Point: Mobility training (Done)    Learning Progress Summary    Learner Readiness Method Response Comment Documented by Status   Patient Acceptance JARET CARRILLO NR  MS 03/08/17 1200 Done    Acceptance TB,E DAVID  RD 03/07/17 1600 Done   Family Acceptance LORENA LAGUNA RD 03/07/17 1600 Done               Point: Home exercise program (Done)    Learning Progress Summary    Learner Readiness Method Response Comment Documented by Status   Patient Acceptance JARET CARRILLO NR  MS 03/08/17 1200 Done    Acceptance TB,E DAVID  RD 03/07/17 1600 Done   Family Acceptance JASEE DAVID  RD 03/07/17 1600 Done               Point: Body mechanics (Done)    Learning Progress Summary    Learner Readiness Method  Response Comment Documented by Status   Patient Acceptance LORENAJARET ROSENFREDDIE  MS 03/08/17 1200 Done    Acceptance TB,E DU  RD 03/07/17 1600 Done   Family Acceptance TB,E DU  RD 03/07/17 1600 Done               Point: Precautions (Done)    Learning Progress Summary    Learner Readiness Method Response Comment Documented by Status   Patient Acceptance JARET CARRILLO NR  MS 03/08/17 1200 Done    Acceptance TB,E DU  RD 03/07/17 1600 Done   Family Acceptance TB,E DU  RD 03/07/17 1600 Done                      User Key     Initials Effective Dates Name Provider Type Discipline    RD 10/06/15 -  Janet Junior, PT Physical Therapist PT    MS 12/01/15 -  Richard Andrews, PT Physical Therapist PT                    PT Recommendation and Plan  Anticipated Discharge Disposition: home with home health  Planned Therapy Interventions: gait training, home exercise program, ROM (Range of Motion), stair training, strengthening, stretching  PT Frequency: 2 times/day             Outcome Measures       03/08/17 1200 03/07/17 1600       How much help from another person do you currently need...    Turning from your back to your side while in flat bed without using bedrails? 4  -MS 4  -RD     Moving from lying on back to sitting on the side of a flat bed without bedrails? 3  -MS 3  -RD     Moving to and from a bed to a chair (including a wheelchair)? 3  -MS 3  -RD     Standing up from a chair using your arms (e.g., wheelchair, bedside chair)? 3  -MS 3  -RD     Climbing 3-5 steps with a railing? 3  -MS 3  -RD     To walk in hospital room? 3  -MS 3  -RD     AM-PAC 6 Clicks Score 19  -MS 19  -RD     Functional Assessment    Outcome Measure Options AM-PAC 6 Clicks Basic Mobility (PT)  -MS AM-PAC 6 Clicks Basic Mobility (PT)  -RD       User Key  (r) = Recorded By, (t) = Taken By, (c) = Cosigned By    Initials Name Provider Type    RD Janet Junior, PT Physical Therapist    MS Richard Andrews, PT Physical Therapist           Time Calculation:          PT Charges       03/08/17 1645 03/08/17 1201       Time Calculation    Start Time 1400  -MARQUEZ 0932  -MS     Stop Time 1450  -MARQUEZ 1015  -MS     Time Calculation (min) 50 min  -MARQUEZ 43 min  -MS     PT Received On 03/08/17  -MARQUEZ 03/08/17  -MS     PT - Next Appointment 03/09/17  - 03/08/17  -MS       User Key  (r) = Recorded By, (t) = Taken By, (c) = Cosigned By    Initials Name Provider Type    MARQUEZ Argueta PTA Physical Therapy Assistant    MS Richard LI Juliana, PT Physical Therapist          Therapy Charges for Today     Code Description Service Date Service Provider Modifiers Qty    44907291716 HC PT THER PROC EA 15 MIN 3/8/2017 Anu Argueta PTA GP 1    94719660360 HC PT THER PROC GROUP 3/8/2017 Anu Argueta PTA GP 1          PT G-Codes  Outcome Measure Options: AM-PAC 6 Clicks Basic Mobility (PT)    Anu Argueta PTA  3/8/2017

## 2017-03-08 NOTE — SIGNIFICANT NOTE
03/08/17 0847   Rehab Treatment   Discipline occupational therapist   Rehab Evaluation   Evaluation Not Performed other (see comments)  (States spouse will be at home to assist with ADLs, no need for AE at this time. Edu on using shower chair for safety. No further OT needs.)

## 2017-03-08 NOTE — PROGRESS NOTES
Discharge Planning Assessment  [facility]     Patient Name: Nixon Chambers  MRN: [id number]  Today's Date: 3/8/2017    Admit Date: 3/7/2017          Discharge Needs Assessment       03/08/17 1109    Discharge Needs Assessment    Discharge Disposition home healthcare service      03/08/17 1108    Living Environment    Lives With spouse    Living Arrangements house    Quality Of Family Relationships involved    Able to Return to Prior Living Arrangements yes    Discharge Needs Assessment    Concerns To Be Addressed denies needs/concerns at this time   HH    Readmission Within The Last 30 Days no previous admission in last 30 days    Equipment Currently Used at Home walker, standard    Equipment Needed After Discharge cane, straight    Transportation Available car;family or friend will provide            Discharge Plan       03/08/17 1059    Case Management/Social Work Plan    Plan home w/ spouse and [facility].     Patient/Family In Agreement With Plan yes    Additional Comments Met w/ pt at the bedside, verified facesheeet and explained CCP role. Pt plans to return home w/ his spouse and [facility]. [facility] w/ [facility] notified and can accept. Pt has a walker, he states he will need a cane. He has 5 steps to enter home, once inside all ADL's are on the main level.         Discharge Placement     Facility/Agency Request Status Selected? Address Phone Number Fax Number    [facility] Accepted    Yes [address] [place] [address] [phone]        Radha Roblero RN 3/8/2017 11:07    3/8/2017  [facility] notified.                    Expected Discharge Date and Time     Expected Discharge Date Expected Discharge Time    Mar 8, 2017               Demographic Summary     None            Functional Status       03/08/17 1108    Functional Status Current    Ambulation 3-->assistive equipment and person    Transferring 3-->assistive equipment and person    Toileting 3-->assistive  equipment and person    Bathing 2-->assistive person    Dressing 2-->assistive person    Eating 0-->independent    Communication 0-->understands/communicates without difficulty    Swallowing (if score 2 or more for any item, consult Rehab Services) 0-->swallows foods/liquids without difficulty    Change in Functional Status Since Onset of Current Illness/Injury yes    Functional Status Prior    Ambulation 0-->independent    Transferring 0-->independent    Toileting 0-->independent    Bathing 0-->independent    Dressing 0-->independent    Eating 0-->independent    Communication 0-->understands/communicates without difficulty    Swallowing 0-->swallows foods/liquids without difficulty    IADL    Medications independent    Meal Preparation independent    Housekeeping independent    Laundry independent    Shopping independent    Oral Care independent    Activity Tolerance    Usual Activity Tolerance good    Current Activity Tolerance fair    Cognitive/Perceptual/Developmental    Current Mental Status/Cognitive Functioning no deficits noted    Recent Changes in Mental Status/Cognitive Functioning no changes            Psychosocial     None            Abuse/Neglect     None            Legal     None            Substance Abuse     None            Patient Forms       03/08/17 1108    Patient Forms    Provider Choice List Delivered    Delivered to Patient    Method of delivery In person          Radha Roblero RN

## 2017-03-08 NOTE — PROGRESS NOTES
Acute Care - Physical Therapy Treatment Note  [facility]     Patient Name: Nixon Chambers  : 1950  MRN: [id number]  Today's Date: 3/8/2017             Admit Date: 3/7/2017    Visit Dx:    ICD-10-CM ICD-9-CM   1. Primary osteoarthritis of left knee M17.12 715.16     Patient Active Problem List   Diagnosis   (none) - all problems resolved or deleted               Adult Rehabilitation Note       17 1158          Rehab Assessment/Intervention    Discipline physical therapist  -MS      Document Type therapy note (daily note)  -MS      Subjective Information agree to therapy;complains of;fatigue;pain  -MS      Patient Effort, Rehab Treatment good  -MS      Symptoms Noted During/After Treatment fatigue;increased pain  -MS      Precautions/Limitations fall precautions  -MS      Recorded by [MS] Richard Andrews PT      Pain Assessment    Pain Assessment 0-10  -MS      Pain Score 5  -MS      Post Pain Score 5  -MS      Pain Type Acute pain;Surgical pain  -MS      Pain Location Knee  -MS      Pain Orientation Left  -MS      Pain Intervention(s) Medication (See MAR);Cold applied;Repositioned;Elevated;Rest  -MS      Recorded by [MS] Richard Andrews PT      Cognitive Assessment/Intervention    Current Cognitive/Communication Assessment functional  -MS      Orientation Status oriented x 4  -MS      Follows Commands/Answers Questions 100% of the time  -MS      Personal Safety WNL/WFL  -MS      Personal Safety Interventions fall prevention program maintained;gait belt;nonskid shoes/slippers when out of bed;supervised activity  -MS      Recorded by [MS] Richard Andrews PT      ROM (Range of Motion)    General ROM Detail Left knee AROM (-7, 75)  -MS      Recorded by [MS] Richard Andrews PT      Bed Mobility, Assessment/Treatment    Bed Mobility, Comment Up in chair  -MS      Recorded by [MS] Richard Andrews PT      Transfer Assessment/Treatment    Transfers, Sit-Stand [place] stand by assist  -MS       Transfers, Stand-Sit [place] stand by assist  -MS      Transfers, Sit-Stand-Sit, Assist Device rolling walker  -MS      Recorded by [MS] Richard Andrews, PT      Gait Assessment/Treatment    Gait, [place] Level contact guard assist  -MS      Gait, Assistive Device rolling walker  -MS      Gait, Distance (Feet) 90  -MS      Gait, Gait Deviations left:;antalgic;archie decreased  -MS      Recorded by [MS] Richard Andrews PT      Stairs Assessment/Treatment    Number of Stairs 3  -MS      Stairs, Handrail Location none  -MS      Stairs, [place] Level contact guard assist   Backwards with use of walker  -MS      Recorded by [MS] Richard Andrews, PT      Therapy Exercises    Exercise Protocols total knee  -MS      Total Knee Exercises left:;15 reps;completed protocol  -MS      Recorded by [MS] Richard Andrews PT      Positioning and Restraints    Pre-Treatment Position sitting in chair/recliner  -MS      Post Treatment Position chair  -MS      In Chair notified nsg;reclined;sitting;call light within reach;encouraged to call for assist   Ice pack to Left knee  -MS      Recorded by [MS] Richard Andrews, PT        User Key  (r) = Recorded By, (t) = Taken By, (c) = Cosigned By    Initials Name Effective Dates    MS Richard Andrews, PT 12/01/15 -                 IP PT Goals       03/07/17 1601          Gait Training PT LTG    Gait Training Goal PT LTG, Time to Achieve 5 days  -RD      Gait Training Goal PT LTG, [place] Level supervision required  -RD      Gait Training Goal PT LTG, Assist Device walker, rolling  -RD      Gait Training Goal PT LTG, Distance to Achieve 80  -RD      Range of Motion PT LTG    Range of Motion Goal PT LTG, Time to Achieve 5 days  -RD      Range fo Motion Goal PT LTG, Joint L knee  -RD      Range of Motion Goal PT LTG, AROM Measure -5-90  -RD      Patient Education PT LTG    Patient Education PT LTG, Time to Achieve 5 days  -RD      Patient Education PT LTG, Education  Type HEP;written program  -RD      Patient Education PT LTG, Education Understanding demonstrate adequately;verbalize understanding  -RD        User Key  (r) = Recorded By, (t) = Taken By, (c) = Cosigned By    Initials Name Provider Type    RD Janet Junior, PT Physical Therapist          Physical Therapy Education     Title: PT OT SLP Therapies (Done)     Topic: Physical Therapy (Done)     Point: Mobility training (Done)    Learning Progress Summary    Learner Readiness Method Response Comment Documented by Status   Patient Acceptance JARET CARRILLO NR  MS 03/08/17 1200 Done    Acceptance TB,E DU  RD 03/07/17 1600 Done   Family Acceptance TB,E DU  RD 03/07/17 1600 Done               Point: Home exercise program (Done)    Learning Progress Summary    Learner Readiness Method Response Comment Documented by Status   Patient Acceptance JARET CARRILLO NR  MS 03/08/17 1200 Done    Acceptance TB,E DU  RD 03/07/17 1600 Done   Family Acceptance TB,E DU  RD 03/07/17 1600 Done               Point: Body mechanics (Done)    Learning Progress Summary    Learner Readiness Method Response Comment Documented by Status   Patient Acceptance JARET CARRILLO NR  MS 03/08/17 1200 Done    Acceptance TB,E DU  RD 03/07/17 1600 Done   Family Acceptance TB,E DU  RD 03/07/17 1600 Done               Point: Precautions (Done)    Learning Progress Summary    Learner Readiness Method Response Comment Documented by Status   Patient Acceptance JARET CARRILLO NR  MS 03/08/17 1200 Done    Acceptance TB,E DU  RD 03/07/17 1600 Done   Family Acceptance TB,E DU  RD 03/07/17 1600 Done                      User Key     Initials Effective Dates Name Provider Type Discipline    RD 10/06/15 -  Janet Junior, PT Physical Therapist PT    MS 12/01/15 -  Richard Andrews, PT Physical Therapist PT                    PT Recommendation and Plan  Anticipated Discharge Disposition: home with home health  Planned Therapy Interventions: gait training, home exercise program, ROM (Range of  Motion), stair training, strengthening, stretching  PT Frequency: 2 times/day  Plan of Care Review  Plan Of Care Reviewed With: patient  Progress: improving  Outcome Summary/Follow up Plan: Improved tolerance to functional activity this AM with an increase in gait distance, progression of ther. ex. protocol, and decreased assist required for overall functional mobility.          Outcome Measures       03/08/17 1200 03/07/17 1600       How much help from another person do you currently need...    Turning from your back to your side while in flat bed without using bedrails? 4  -MS 4  -RD     Moving from lying on back to sitting on the side of a flat bed without bedrails? 3  -MS 3  -RD     Moving to and from a bed to a chair (including a wheelchair)? 3  -MS 3  -RD     Standing up from a chair using your arms (e.g., wheelchair, bedside chair)? 3  -MS 3  -RD     Climbing 3-5 steps with a railing? 3  -MS 3  -RD     To walk in hospital room? 3  -MS 3  -RD     AM-PAC 6 Clicks Score 19  -MS 19  -RD     Functional Assessment    Outcome Measure Options AM-PAC 6 Clicks Basic Mobility (PT)  -MS AM-PAC 6 Clicks Basic Mobility (PT)  -RD       User Key  (r) = Recorded By, (t) = Taken By, (c) = Cosigned By    Initials Name Provider Type    RD Janet Junior, PT Physical Therapist    MS Richard Andrews, PT Physical Therapist           Time Calculation:         PT Charges       03/08/17 1201          Time Calculation    Start Time 0932  -MS      Stop Time 1015  -MS      Time Calculation (min) 43 min  -MS      PT Received On 03/08/17  -MS      PT - Next Appointment 03/08/17  -MS        User Key  (r) = Recorded By, (t) = Taken By, (c) = Cosigned By    Initials Name Provider Type    MS Richard Andrews, PT Physical Therapist          Therapy Charges for Today     Code Description Service Date Service Provider Modifiers Qty    35884447432  PT THER PROC EA 15 MIN 3/8/2017 Richard Andrews, PT GP 1    11643281025  PT THER PROC GROUP  3/8/2017 Richard Andrews, PT GP 1          PT G-Codes  Outcome Measure Options: AM-PAC 6 Clicks Basic Mobility (PT)    Richard Andrews, PT  3/8/2017

## 2017-03-08 NOTE — PROGRESS NOTES
Orthopedic Progress Note      Patient: Nixon Chambers  YOB: 1950     Date of Admission: 3/7/2017  5:35 AM Medical Record Number: [id number]     Attending Physician: Jovana Blackwood, *    Status Post:  Left  NH TOTAL KNEE ARTHROPLASTY [23190] (LT TOTAL KNEE ARTHROPLASTY) Post Operative Day Number: 1    Subjective : No new orthopaedic complaints     Pain Relief: some relief with present medication.     Systemic Complaints: No Complaints  Vitals:    03/07/17 1900 03/07/17 2300 03/08/17 0300 03/08/17 0700   BP: 113/61 113/69 105/61 105/66   BP Location: Left arm Right arm Right arm Right arm   Patient Position: Sitting Lying Lying Sitting   Pulse: 78 70 78 77   Resp: 16 16 16 16   Temp: 98.7 °F (37.1 °C) 97.4 °F (36.3 °C) 96.9 °F (36.1 °C) 97.9 °F (36.6 °C)   TempSrc: Oral Oral Oral Oral   SpO2: 96% 98% 99% 97%   Weight:       Height:           Physical Exam: 66 y.o. male    General Appearance:       Alert, cooperative, in no acute distress                  Extremities:    Dressing Clean, Dry and Intact         Incision healthy without signs or symptoms of infections         No clinical sign of DVT        Able to do good movements of digits    Pulses:   Pulses palpable and equal bilaterally           Diagnostic Tests:      Results from last 7 days  Lab Units 03/08/17  0454   WBC 10*3/mm3 16.60*   HEMOGLOBIN g/dL 12.5*   HEMATOCRIT % 36.3*   PLATELETS 10*3/mm3 168       Results from last 7 days  Lab Units 03/08/17  0454   SODIUM mmol/L 136   POTASSIUM mmol/L 3.8   CHLORIDE mmol/L 99   TOTAL CO2 mmol/L 23.9   BUN mg/dL 21   CREATININE mg/dL 0.94   GLUCOSE mg/dL 183*   CALCIUM mg/dL 8.5*           Xr Knee 1 Or 2 View Left    Result Date: 3/7/2017  Narrative: Left knee  HISTORY knee replacement.  2 views of the left knee demonstrates a total knee prosthesis which appears to be in satisfactory position. There is no evidence of fracture or dislocation  This report was finalized on 3/7/2017 10:12 AM by  Dr. Sathya Pyle MD.      Xr Chest Pa & Lateral    Result Date: 2/27/2017  Narrative: TWO-VIEW CHEST  HISTORY: Morbidly obese 66-year-old male preop left knee surgery with history of hypertension  COMPARISON: (none available)  FINDINGS: 1. Elevation of the right hemidiaphragm with blunting of the right lateral costophrenic angle probably chronic, no posterior effusion. 2. Diffuse spondylosis with compression deformities of the spine, not acute. 3. Borderline cardiac enlargement. 4. Right axillary dissection, no additional pertinent history provided. 5. The lungs appear clear of acute process.  This report was finalized on 2/27/2017 4:15 PM by Dr. Clifford Rivas MD.          Current Medications:  Scheduled Meds:  aspirin 325 mg Oral BID   celecoxib 200 mg Oral Daily   docusate sodium 100 mg Oral BID   gabapentin 300 mg Oral Daily   insulin aspart 0-7 Units Subcutaneous 4x Daily AC & at Bedtime   lisinopril 20 mg Oral Q24H   terazosin 2 mg Oral Nightly     Continuous Infusions:  sodium chloride 100 mL/hr     PRN Meds:.•  acetaminophen  •  bisacodyl  •  dextrose  •  dextrose  •  glucagon (human recombinant)  •  HYDROcodone-acetaminophen  •  Morphine **AND** naloxone  •  ondansetron    Assessment: Left Status post  ID TOTAL KNEE ARTHROPLASTY [[id number]] (LT TOTAL KNEE ARTHROPLASTY)    Patient Active Problem List   Diagnosis   • Primary osteoarthritis of left knee       PLAN:   Continues current post-op course  Anticoagulation: Aspirin started   Dressing Change in am  Mobilize with PT as tolerated per protocol    Weight Bearing: WBAT  Discharge Plan: OK to plan for discharge in  today to home and home health  from orthopadic perspective.    Jovana Blackwood MD    Date: 3/8/2017    Time: 8:24 AM    EMR Dragon/Transcription disclaimer:   Much of this encounter note is an electronic transcription/translation of spoken language to printed text. The electronic translation of spoken language may permit erroneous, or at  times, nonsensical words or phrases to be inadvertently transcribed; Although I have reviewed the note for such errors, some may still exist.

## 2017-03-09 VITALS
SYSTOLIC BLOOD PRESSURE: 112 MMHG | RESPIRATION RATE: 16 BRPM | DIASTOLIC BLOOD PRESSURE: 62 MMHG | BODY MASS INDEX: 36.69 KG/M2 | TEMPERATURE: 97.6 F | HEIGHT: 70 IN | OXYGEN SATURATION: 96 % | HEART RATE: 75 BPM | WEIGHT: 256.31 LBS

## 2017-03-09 LAB
ANION GAP SERPL CALCULATED.3IONS-SCNC: 8.1 MMOL/L
BASOPHILS # BLD AUTO: 0 10*3/MM3 (ref 0–0.2)
BASOPHILS NFR BLD AUTO: 0 % (ref 0–1.5)
BUN BLD-MCNC: 14 MG/DL (ref 8–23)
BUN/CREAT SERPL: 17.1 (ref 7–25)
CALCIUM SPEC-SCNC: 8.6 MG/DL (ref 8.6–10.5)
CHLORIDE SERPL-SCNC: 101 MMOL/L (ref 98–107)
CO2 SERPL-SCNC: 28.9 MMOL/L (ref 22–29)
CREAT BLD-MCNC: 0.82 MG/DL (ref 0.76–1.27)
DEPRECATED RDW RBC AUTO: 44.3 FL (ref 37–54)
EOSINOPHIL # BLD AUTO: 0.06 10*3/MM3 (ref 0–0.7)
EOSINOPHIL NFR BLD AUTO: 0.5 % (ref 0.3–6.2)
ERYTHROCYTE [DISTWIDTH] IN BLOOD BY AUTOMATED COUNT: 12.9 % (ref 11.5–14.5)
GFR SERPL CREATININE-BSD FRML MDRD: 95 ML/MIN/1.73
GLUCOSE BLD-MCNC: 153 MG/DL (ref 65–99)
GLUCOSE BLDC GLUCOMTR-MCNC: 139 MG/DL (ref 70–130)
GLUCOSE BLDC GLUCOMTR-MCNC: 169 MG/DL (ref 70–130)
HCT VFR BLD AUTO: 34.8 % (ref 40.4–52.2)
HGB BLD-MCNC: 11.7 G/DL (ref 13.7–17.6)
IMM GRANULOCYTES # BLD: 0.03 10*3/MM3 (ref 0–0.03)
IMM GRANULOCYTES NFR BLD: 0.2 % (ref 0–0.5)
LYMPHOCYTES # BLD AUTO: 1.65 10*3/MM3 (ref 0.9–4.8)
LYMPHOCYTES NFR BLD AUTO: 13.3 % (ref 19.6–45.3)
MCH RBC QN AUTO: 31.5 PG (ref 27–32.7)
MCHC RBC AUTO-ENTMCNC: 33.6 G/DL (ref 32.6–36.4)
MCV RBC AUTO: 93.5 FL (ref 79.8–96.2)
MONOCYTES # BLD AUTO: 1.31 10*3/MM3 (ref 0.2–1.2)
MONOCYTES NFR BLD AUTO: 10.6 % (ref 5–12)
NEUTROPHILS # BLD AUTO: 9.31 10*3/MM3 (ref 1.9–8.1)
NEUTROPHILS NFR BLD AUTO: 75.4 % (ref 42.7–76)
PLATELET # BLD AUTO: 155 10*3/MM3 (ref 140–500)
PMV BLD AUTO: 11 FL (ref 6–12)
POTASSIUM BLD-SCNC: 4.3 MMOL/L (ref 3.5–5.2)
RBC # BLD AUTO: 3.72 10*6/MM3 (ref 4.6–6)
SODIUM BLD-SCNC: 138 MMOL/L (ref 136–145)
WBC NRBC COR # BLD: 12.36 10*3/MM3 (ref 4.5–10.7)

## 2017-03-09 PROCEDURE — 80048 BASIC METABOLIC PNL TOTAL CA: CPT | Performed by: ORTHOPAEDIC SURGERY

## 2017-03-09 PROCEDURE — 97150 GROUP THERAPEUTIC PROCEDURES: CPT

## 2017-03-09 PROCEDURE — 97110 THERAPEUTIC EXERCISES: CPT

## 2017-03-09 PROCEDURE — 63710000001 INSULIN ASPART PER 5 UNITS: Performed by: HOSPITALIST

## 2017-03-09 PROCEDURE — 85025 COMPLETE CBC W/AUTO DIFF WBC: CPT | Performed by: ORTHOPAEDIC SURGERY

## 2017-03-09 PROCEDURE — 94799 UNLISTED PULMONARY SVC/PX: CPT

## 2017-03-09 PROCEDURE — 82962 GLUCOSE BLOOD TEST: CPT

## 2017-03-09 RX ADMIN — HYDROCODONE BITARTRATE AND ACETAMINOPHEN 1 TABLET: 7.5; 325 TABLET ORAL at 09:12

## 2017-03-09 RX ADMIN — ASPIRIN 325 MG: 325 TABLET, DELAYED RELEASE ORAL at 09:12

## 2017-03-09 RX ADMIN — HYDROCODONE BITARTRATE AND ACETAMINOPHEN 1 TABLET: 7.5; 325 TABLET ORAL at 05:54

## 2017-03-09 RX ADMIN — HYDROCODONE BITARTRATE AND ACETAMINOPHEN 1 TABLET: 7.5; 325 TABLET ORAL at 13:38

## 2017-03-09 RX ADMIN — INSULIN ASPART 2 UNITS: 100 INJECTION, SOLUTION INTRAVENOUS; SUBCUTANEOUS at 13:38

## 2017-03-09 RX ADMIN — HYDROCODONE BITARTRATE AND ACETAMINOPHEN 1 TABLET: 7.5; 325 TABLET ORAL at 01:49

## 2017-03-09 RX ADMIN — DOCUSATE SODIUM 100 MG: 100 CAPSULE, LIQUID FILLED ORAL at 09:12

## 2017-03-09 RX ADMIN — CELECOXIB 200 MG: 200 CAPSULE ORAL at 09:12

## 2017-03-09 NOTE — THERAPY DISCHARGE NOTE
Acute Care - Physical Therapy Treatment Note/Discharge  [facility]     Patient Name: Nixon Chambers  : 1950  MRN: [id number]  Today's Date: 3/9/2017             Admit Date: 3/7/2017    Visit Dx:    ICD-10-CM ICD-9-CM   1. Primary osteoarthritis of left knee M17.12 715.16     Patient Active Problem List   Diagnosis   • Osteoarthritis of knee   • Type 2 diabetes mellitus   • Hypertension       Physical Therapy Education     Title: PT OT SLP Therapies (Resolved)     Topic: Physical Therapy (Resolved)     Point: Mobility training (Resolved)    Learning Progress Summary    Learner Readiness Method Response Comment Documented by Status   Patient Acceptance ED DAVID ROSEN  MS 17 1036 Done    Acceptance E,D VU,NR  MS 17 1200 Done    Acceptance TB,E DU  RD 17 1600 Done   Family Acceptance TB,E DU  RD 17 1600 Done               Point: Home exercise program (Resolved)    Learning Progress Summary    Learner Readiness Method Response Comment Documented by Status   Patient Acceptance JARET CARRILLO DU  MS 17 1036 Done    Acceptance E,D VU,NR  MS 17 1200 Done    Acceptance TB,E DU  RD 17 1600 Done   Family Acceptance TB,E DU  RD 17 1600 Done               Point: Body mechanics (Resolved)    Learning Progress Summary    Learner Readiness Method Response Comment Documented by Status   Patient Acceptance EJARET DU  MS 17 1036 Done    Acceptance E,D VU,NR  MS 17 1200 Done    Acceptance TB,E DU  RD 17 1600 Done   Family Acceptance TB,E DU  RD 17 1600 Done               Point: Precautions (Resolved)    Learning Progress Summary    Learner Readiness Method Response Comment Documented by Status   Patient Acceptance ED DAVID ROSEN  MS 17 1036 Done    Acceptance E,D VU,NR  MS 17 1200 Done    Acceptance TB,E DU  RD 17 1600 Done   Family Acceptance TB,E DU  RD 17 1600 Done                      User Key     Initials Effective Dates Name Provider Type  Discipline    RD 10/06/15 -  Janet Junior, PT Physical Therapist PT    MS 12/01/15 -  Richard Andrews, PT Physical Therapist PT                    IP PT Goals       03/09/17 1037 03/07/17 1601       Gait Training PT LTG    Gait Training Goal PT LTG, Time to Achieve  5 days  -RD     Gait Training Goal PT LTG, [place] Level  supervision required  -RD     Gait Training Goal PT LTG, Assist Device  walker, rolling  -RD     Gait Training Goal PT LTG, Distance to Achieve  80  -RD     Gait Training Goal PT LTG, Outcome goal met  -MS      Range of Motion PT LTG    Range of Motion Goal PT LTG, Time to Achieve  5 days  -RD     Range fo Motion Goal PT LTG, Joint L knee  -MS L knee  -RD     Range of Motion Goal PT LTG, AROM Measure (-7, 78)  -MS -5-90  -RD     Range of Motion Goal PT LTG, Outcome goal not met  -MS      Reason Goal Not Met (ROM) PT LTG discharged from facility  -MS      Patient Education PT LTG    Patient Education PT LTG, Time to Achieve  5 days  -RD     Patient Education PT LTG, Education Type  HEP;written program  -RD     Patient Education PT LTG, Education Understanding  demonstrate adequately;verbalize understanding  -RD     Patient Education PT LTG Outcome goal met  -MS        User Key  (r) = Recorded By, (t) = Taken By, (c) = Cosigned By    Initials Name Provider Type    RD Janet Junior, PT Physical Therapist    MS Richard Andrews, PT Physical Therapist              Adult Rehabilitation Note       03/09/17 1034 03/08/17 1644 03/08/17 1158    Rehab Assessment/Intervention    Discipline physical therapist  -MS physical therapy assistant  -JM physical therapist  -MS    Document Type therapy note (daily note);discharge summary  -MS therapy note (daily note)  -JM therapy note (daily note)  -MS    Subjective Information agree to therapy;complains of;pain  -MS agree to therapy;complains of;pain;fatigue  -JM agree to therapy;complains of;fatigue;pain  -MS    Patient Effort, Rehab Treatment   good   -MS    Symptoms Noted During/After Treatment fatigue  -MS  fatigue;increased pain  -MS    Precautions/Limitations  fall precautions  -JM fall precautions  -MS    Recorded by [MS] Richard Andrews PT [JM] Anu Argueta PTA [MS] Richard Andrews PT    Pain Assessment    Pain Assessment 0-10  -MS 0-10  -JM 0-10  -MS    Pain Score 5  -MS 5  -JM 5  -MS    Post Pain Score 5  -MS  5  -MS    Pain Type Acute pain;Surgical pain  -MS Surgical pain  -JM Acute pain;Surgical pain  -MS    Pain Location Knee  -MS Knee  -JM Knee  -MS    Pain Orientation Left  -MS Left  -JM Left  -MS    Pain Intervention(s) Medication (See MAR);Cold applied;Repositioned;Elevated;Rest  -MS Medication (See MAR);Repositioned;Cold applied  -JM Medication (See MAR);Cold applied;Repositioned;Elevated;Rest  -MS    Recorded by [MS] Richard Andrews PT [JM] Anu Argueta PTA [MS] Richard Andrews PT    Cognitive Assessment/Intervention    Current Cognitive/Communication Assessment functional  -MS functional  -JM functional  -MS    Orientation Status oriented x 4  -MS oriented x 4  -JM oriented x 4  -MS    Follows Commands/Answers Questions 100% of the time  -MS  100% of the time  -MS    Personal Safety WNL/WFL  -MS  WNL/WFL  -MS    Personal Safety Interventions fall prevention program maintained;gait belt;nonskid shoes/slippers when out of bed;supervised activity  -MS  fall prevention program maintained;gait belt;nonskid shoes/slippers when out of bed;supervised activity  -MS    Recorded by [MS] Richard Andrews PT [JM] Anu Argueta PTA [MS] Richard Andrews PT    ROM (Range of Motion)    General ROM Detail Left Knee AROM (-7, 78)  -MS  Left knee AROM (-7, 75)  -MS    Recorded by [MS] Richard Andrews PT  [MS] Richard Andrews PT    Bed Mobility, Assessment/Treatment    Bed Mob, Supine to Sit, [place] independent  -MS      Bed Mob, Sit to Supine, [place] independent  -MS      Bed Mobility, Comment   Up in chair  -MS    Recorded by  [MS] Richard Andrews, PT  [MS] Richard Andrews PT    Transfer Assessment/Treatment    Transfers, Sit-Stand [place] independent  -MS stand by assist  -JM stand by assist  -MS    Transfers, Stand-Sit [place] independent  -MS stand by assist  -JM stand by assist  -MS    Transfers, Sit-Stand-Sit, Assist Device standard walker  -MS rolling walker  -JM rolling walker  -MS    Recorded by [MS] Richard Andrews PT [JM] Anu Argueta PTA [MS] Richard Andrews PT    Gait Assessment/Treatment    Gait, [place] Level independent  -MS contact guard assist  -JM contact guard assist  -MS    Gait, Assistive Device standard walker  -MS rolling walker  -JM rolling walker  -MS    Gait, Distance (Feet) 200  -  -JM 90  -MS    Gait, Gait Deviations left:;antalgic  -MS  left:;antalgic;archie decreased  -MS    Gait, Comment  cues for posture  -JM     Recorded by [MS] Richard Andrews PT [JM] Anu Argueta PTA [MS] Richard Andrews PT    Stairs Assessment/Treatment    Number of Stairs 4  -MS  3  -MS    Stairs, Handrail Location none  -MS  none  -MS    Stairs, [place] Level contact guard assist   Backwards with use of walker.  -MS --   Backwards with use of walker  -JM contact guard assist   Backwards with use of walker  -MS    Recorded by [MS] Richard Andrews PT [JM] Anu Argueta, EROS [MS] Richard Andrews PT    Therapy Exercises    Exercise Protocols total knee  -MS total knee  -JM total knee  -MS    Total Knee Exercises left:;25 reps;completed protocol  -MS left:;15 reps;completed protocol  -JM left:;15 reps;completed protocol  -MS    Recorded by [MS] Richard Andrews PT [JM] Anu Argueta, PTA [MS] Richard Andrews PT    Positioning and Restraints    Pre-Treatment Position sitting in chair/recliner  -MS sitting in chair/recliner  -JM sitting in chair/recliner  -MS    Post Treatment Position chair  -MS  chair  -MS    In Chair notified nsg;reclined;sitting;call light within reach;encouraged  to call for assist   Ice pack to Left knee.  -MS reclined;call light within reach;encouraged to call for assist;with family/caregiver  -MARQUEZ notified nsg;reclined;sitting;call light within reach;encouraged to call for assist   Ice pack to Left knee  -MS    Recorded by [MS] Richard Andrews, PT [JM] Anu Argueta PTA [MS] Richard Andrews, PT      User Key  (r) = Recorded By, (t) = Taken By, (c) = Cosigned By    Initials Name Effective Dates    MARQUEZ Argueta PTA 02/18/16 -     MS Richard Andrews PT 12/01/15 -           PT Recommendation and Plan  Anticipated Discharge Disposition: home with home health  Planned Therapy Interventions: gait training, home exercise program, ROM (Range of Motion), stair training, strengthening, stretching  PT Frequency: 2 times/day  Plan of Care Review  Plan Of Care Reviewed With: patient  Progress: improving  Outcome Summary/Follow up Plan: Improved tolerance to functional activity this AM with an increase in gait distance, progression of ther. ex. protocol, and decreased assist required for overall functional mobility.          Outcome Measures       03/09/17 1000 03/08/17 1200 03/07/17 1600    How much help from another person do you currently need...    Turning from your back to your side while in flat bed without using bedrails? 4  -MS 4  -MS 4  -RD    Moving from lying on back to sitting on the side of a flat bed without bedrails? 4  -MS 3  -MS 3  -RD    Moving to and from a bed to a chair (including a wheelchair)? 4  -MS 3  -MS 3  -RD    Standing up from a chair using your arms (e.g., wheelchair, bedside chair)? 4  -MS 3  -MS 3  -RD    Climbing 3-5 steps with a railing? 3  -MS 3  -MS 3  -RD    To walk in hospital room? 4  -MS 3  -MS 3  -RD    AM-PAC 6 Clicks Score 23  -MS 19  -MS 19  -RD    Functional Assessment    Outcome Measure Options AM-PAC 6 Clicks Basic Mobility (PT)  -MS AM-PAC 6 Clicks Basic Mobility (PT)  -MS AM-PAC 6 Clicks Basic Mobility (PT)  -RD      User  Key  (r) = Recorded By, (t) = Taken By, (c) = Cosigned By    Initials Name Provider Type    REESE Junior, PT Physical Therapist    MS Richard Andrews, PT Physical Therapist           Time Calculation:         PT Charges       03/09/17 1038          Time Calculation    Start Time 0935  -MS      Stop Time 1017  -MS      Time Calculation (min) 42 min  -MS      PT Received On 03/09/17  -MS        User Key  (r) = Recorded By, (t) = Taken By, (c) = Cosigned By    Initials Name Provider Type    MS Richard Andrews, PT Physical Therapist          Therapy Charges for Today     Code Description Service Date Service Provider Modifiers Qty    70782619136 HC PT THER PROC EA 15 MIN 3/8/2017 Richard Andrews, PT GP 1    22315305162 HC PT THER PROC GROUP 3/8/2017 Richard Andrews, PT GP 1    69888045053 HC PT THER PROC EA 15 MIN 3/9/2017 Richard Andrews, PT GP 1    48779555001 HC PT THER PROC GROUP 3/9/2017 Richard Andrews, PT GP 1          PT G-Codes  Outcome Measure Options: AM-PAC 6 Clicks Basic Mobility (PT)    PT Discharge Summary  Reason for Discharge: Discharge from facility  Outcomes Achieved: Refer to plan of care for updates on goals achieved  Discharge Destination: Home with assist, Home with home health    Richard Andrews, PT  3/9/2017

## 2017-03-09 NOTE — PLAN OF CARE
Problem: Patient Care Overview (Adult)  Goal: Plan of Care Review  Outcome: Ongoing (interventions implemented as appropriate)    03/08/17 1838 03/09/17 0501   Coping/Psychosocial Response Interventions   Plan Of Care Reviewed With --  patient   Outcome Evaluation   Outcome Summary/Follow up Plan --  VSS. Pain controlled with PO pain medication and IV morphine x1 for breakthrough pain on this shift with effective results. Pt had 1 episode of nausea . Resolved with IV Zofran. Pt up to chair resting and sleeping most of this shift per his preference. States it helped with pain of knee. Pt ambulating well to bathroom with standby assist x1 and walker.    Patient Care Overview   Progress improving --        Goal: Adult Individualization and Mutuality  Outcome: Ongoing (interventions implemented as appropriate)  Goal: Discharge Needs Assessment  Outcome: Ongoing (interventions implemented as appropriate)    Problem: Fall Risk (Adult)  Goal: Absence of Falls  Outcome: Ongoing (interventions implemented as appropriate)    Problem: Knee Replacement, Total (Adult)  Goal: Signs and Symptoms of Listed Potential Problems Will be Absent or Manageable (Knee Replacement, Total)  Outcome: Ongoing (interventions implemented as appropriate)

## 2017-03-09 NOTE — PAYOR COMM NOTE
"Nixon Chambers (66 y.o. Male)     Date of Birth Social Security Number Address Home Phone MRN    1950  [id number] [address] [phone] [id number]    Tenriism Marital Status          Christian        Admission Date Admission Type Admitting Provider Attending Provider Department, Room/Bed    3/7/17 Elective Jovana Blackwood MD  [address], P780/1    Discharge Date Discharge Disposition Discharge Destination        3/9/2017 Home-Health Care [facility]             Attending Provider: (none)    Allergies:  No Known Allergies    Isolation:  None   Infection:  None   Code Status:  FULL    Ht:  70\" (177.8 cm)   Wt:  256 lb 5 oz (116 kg)    Admission Cmt:  None   Principal Problem:  None                Active Insurance as of 3/7/2017     Primary Coverage     Payor Plan Insurance Group Employer/Plan Group    [employer] BLUE [employer]OSS [employer] BLUE 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

## 2017-03-09 NOTE — PLAN OF CARE
Problem: Inpatient Physical Therapy  Goal: Gait Training Goal LTG- PT    03/09/17 1037   Gait Training PT LTG   Gait Training Goal PT LTG, Outcome goal met       Goal: Range of Motion Goal LTG- PT    03/09/17 1037   Range of Motion PT LTG   Range fo Motion Goal PT LTG, Joint L knee   Range of Motion Goal PT LTG, AROM Measure (-7, 78)   Range of Motion Goal PT LTG, Outcome goal not met   Reason Goal Not Met (ROM) PT LTG discharged from facility       Goal: Patient Education Goal LTG- PT    03/09/17 1037   Patient Education PT LTG   Patient Education PT LTG Outcome goal met

## 2017-03-09 NOTE — PLAN OF CARE
Problem: Patient Care Overview (Adult)  Goal: Plan of Care Review  Outcome: Outcome(s) achieved Date Met:  03/09/17 03/09/17 1052   Coping/Psychosocial Response Interventions   Plan Of Care Reviewed With patient   Outcome Evaluation   Outcome Summary/Follow up Plan SD home   Patient Care Overview   Progress improving       Goal: Adult Individualization and Mutuality  Outcome: Outcome(s) achieved Date Met:  03/09/17  Goal: Discharge Needs Assessment  Outcome: Outcome(s) achieved Date Met:  03/09/17    Problem: Fall Risk (Adult)  Goal: Absence of Falls  Outcome: Outcome(s) achieved Date Met:  03/09/17    Problem: Knee Replacement, Total (Adult)  Goal: Signs and Symptoms of Listed Potential Problems Will be Absent or Manageable (Knee Replacement, Total)  Outcome: Outcome(s) achieved Date Met:  03/09/17

## 2017-03-09 NOTE — DISCHARGE SUMMARY
Orthopedic Discharge Summary      Patient: Nixon Chambers   YOB: 1950    Medical Record Number: [id number]    Attending Physician: No att. providers found  Consulting Physician(s):   Date of Admission: 3/7/2017  5:35 AM  Date of Discharge: 3/9/2017       [place] TOTAL KNEE ARTHROPLASTY [23380] (LT TOTAL KNEE ARTHROPLASTY)    Patient Active Problem List   Diagnosis   • Osteoarthritis of knee   • Type 2 diabetes mellitus   • Hypertension        No Known Allergies     Nixon Chambers   Home Medication Instructions RAFITA:[id number]    Printed on:03/09/17 5606   Medication Information                      aspirin  MG EC tablet  Take 1 tablet by mouth 2 (Two) Times a Day for 21 days.             atorvastatin (LIPITOR) 20 MG tablet  Take 20 mg by mouth Daily.             B Complex Vitamins (VITAMIN B COMPLEX) capsule capsule  Take 1 capsule by mouth Daily.             celecoxib (CeleBREX) 200 MG capsule  Take 1 capsule by mouth Daily.             doxazosin (CARDURA) 2 MG tablet  Take 2 mg by mouth Every Night.             gabapentin (NEURONTIN) 300 MG capsule  Take 300 mg by mouth Daily.             HYDROcodone-acetaminophen (NORCO) 7.5-325 MG per tablet  Take 1 tablet by mouth Every 4 (Four) Hours As Needed for moderate pain (4-6).             lisinopril-hydrochlorothiazide (PRINZIDE,ZESTORETIC) 20-12.5 MG per tablet  Take 1 tablet by mouth Daily.             metFORMIN (GLUCOPHAGE) 500 MG tablet  Take 500 mg by mouth 2 (Two) Times a Day With Meals.             Omega-3 Fatty Acids (FISH OIL) 1000 MG capsule capsule  Take 1,000 mg by mouth Daily With Breakfast.             Zinc 50 MG tablet  Take 1 tablet by mouth Daily.                    Past Medical History   Diagnosis Date   • Cancer 2001     MELANOMA   • Diabetes mellitus    • Hard to intubate      WITH ROTATOR CUFF SURGERY, WAS INTUBATED WHILE AWAKE X1 WITH MELANOMA SURGERY   • High cholesterol    • Hypertension    • Knee pain, left    • Sleep  apnea      WEARS CPAP        Past Surgical History   Procedure Laterality Date   • Knee arthroplasty Right 2000   • Rotator cuff repair Right 1995   • Skin cancer excision       MID AND LOWER BACK   • Skin graft       BACK   • Pr total knee arthroplasty Left 3/7/2017     Procedure: LT TOTAL KNEE ARTHROPLASTY;  Surgeon: Jovana Blackwood MD;  Location: [facility] OR;  Service: Orthopedics        Social History     Occupational History   • Not on file.     Social History Main Topics   • Smoking status: Never Smoker   • Smokeless tobacco: Never Used   • Alcohol use No   • Drug use: No   • Sexual activity: Defer      Social History     Social History Narrative      History reviewed. No pertinent family history.    Physical Exam: 66 y.o. male  General Appearance:    Alert, cooperative, in no acute distress                      Vitals:    03/08/17 2327 03/09/17 0321 03/09/17 0700 03/09/17 1100   BP: 120/74 125/69 109/67 112/62   BP Location: Right arm Right arm Right arm Right arm   Patient Position: Sitting Sitting Sitting Sitting   Pulse: 78 79 78 75   Resp: 16 16 16 16   Temp: 97.9 °F (36.6 °C) 98 °F (36.7 °C) 97.2 °F (36.2 °C) 97.6 °F (36.4 °C)   TempSrc: Oral Oral Oral Oral   SpO2: 98% 96% 98% 96%   Weight:       Height:            Hospital Course:  66 y.o. male admitted to [facility] to services of No att. providers found with Osteoarthritis, knee [M17.9] on 3/7/2017 and underwent [unfilled]  Per Jovana Blackwood MD. Antibiotic and VTE prophylaxis were per SCIP protocols and included  Kefzol 2 gm every 8 hours and Aspirin 325 mg twice daily . Post-operatively the patient transferred to the post-operative floor where the patient underwent mobilization therapy that included active as well as passive ROM exercises. Opioids were titrated to achieve appropriate pain management to allow for participation in mobilization exercises. Vital signs are now stable. The incision is intact without signs or  symptoms of infection. Operative extremity neurovascular status remains intact.   Appropriate education re: incision care, activity levels, medications, and follow up visits was completed and all questions were answered. The patient is now deemed stable for discharge.      DIAGNOSTIC TESTS:     Admission on 03/07/2017, Discharged on 03/09/2017   Component Date Value Ref Range Status   • ABO Type 03/07/2017 A   Final   • RH type 03/07/2017 Positive   Final   • Antibody Screen 03/07/2017 Negative   Final   • Glucose 03/07/2017 129  70 - 130 mg/dL Final   • Glucose 03/07/2017 125  70 - 130 mg/dL Final   • Hemoglobin A1C 03/07/2017 6.16* 4.80 - 5.60 % Final   • Glucose 03/07/2017 203* 70 - 130 mg/dL Final   • Glucose 03/07/2017 220* 70 - 130 mg/dL Final   • Glucose 03/08/2017 183* 65 - 99 mg/dL Final   • BUN 03/08/2017 21  8 - 23 mg/dL Final   • Creatinine 03/08/2017 0.94  0.76 - 1.27 mg/dL Final   • Sodium 03/08/2017 136  136 - 145 mmol/L Final   • Potassium 03/08/2017 3.8  3.5 - 5.2 mmol/L Final   • Chloride 03/08/2017 99  98 - 107 mmol/L Final   • CO2 03/08/2017 23.9  22.0 - 29.0 mmol/L Final   • Calcium 03/08/2017 8.5* 8.6 - 10.5 mg/dL Final   • eGFR Non African Amer 03/08/2017 81  >60 mL/min/1.73 Final   • BUN/Creatinine Ratio 03/08/2017 22.3  7.0 - 25.0 Final   • Anion Gap 03/08/2017 13.1  mmol/L Final   • WBC 03/08/2017 16.60* 4.50 - 10.70 10*3/mm3 Final   • RBC 03/08/2017 3.94* 4.60 - 6.00 10*6/mm3 Final   • Hemoglobin 03/08/2017 12.5* 13.7 - 17.6 g/dL Final   • Hematocrit 03/08/2017 36.3* 40.4 - 52.2 % Final   • MCV 03/08/2017 92.1  79.8 - 96.2 fL Final   • MCH 03/08/2017 31.7  27.0 - 32.7 pg Final   • MCHC 03/08/2017 34.4  32.6 - 36.4 g/dL Final   • RDW 03/08/2017 12.7  11.5 - 14.5 % Final   • RDW-SD 03/08/2017 42.9  37.0 - 54.0 fl Final   • MPV 03/08/2017 11.1  6.0 - 12.0 fL Final   • Platelets 03/08/2017 168  140 - 500 10*3/mm3 Final   • Neutrophil % 03/08/2017 84.4* 42.7 - 76.0 % Final   • Lymphocyte %  03/08/2017 5.4* 19.6 - 45.3 % Final   • Monocyte % 03/08/2017 9.9  5.0 - 12.0 % Final   • Eosinophil % 03/08/2017 0.0* 0.3 - 6.2 % Final   • Basophil % 03/08/2017 0.0  0.0 - 1.5 % Final   • Immature Grans % 03/08/2017 0.3  0.0 - 0.5 % Final   • Neutrophils, Absolute 03/08/2017 14.00* 1.90 - 8.10 10*3/mm3 Final   • Lymphocytes, Absolute 03/08/2017 0.90  0.90 - 4.80 10*3/mm3 Final   • Monocytes, Absolute 03/08/2017 1.65* 0.20 - 1.20 10*3/mm3 Final   • Eosinophils, Absolute 03/08/2017 0.00  0.00 - 0.70 10*3/mm3 Final   • Basophils, Absolute 03/08/2017 0.00  0.00 - 0.20 10*3/mm3 Final   • Immature Grans, Absolute 03/08/2017 0.05* 0.00 - 0.03 10*3/mm3 Final   • Glucose 03/08/2017 153* 70 - 130 mg/dL Final   • Glucose 03/08/2017 151* 70 - 130 mg/dL Final   • Glucose 03/08/2017 188* 70 - 130 mg/dL Final   • Glucose 03/08/2017 183* 70 - 130 mg/dL Final   • Glucose 03/09/2017 153* 65 - 99 mg/dL Final   • BUN 03/09/2017 14  8 - 23 mg/dL Final   • Creatinine 03/09/2017 0.82  0.76 - 1.27 mg/dL Final   • Sodium 03/09/2017 138  136 - 145 mmol/L Final   • Potassium 03/09/2017 4.3  3.5 - 5.2 mmol/L Final   • Chloride 03/09/2017 101  98 - 107 mmol/L Final   • CO2 03/09/2017 28.9  22.0 - 29.0 mmol/L Final   • Calcium 03/09/2017 8.6  8.6 - 10.5 mg/dL Final   • eGFR Non African Amer 03/09/2017 95  >60 mL/min/1.73 Final   • BUN/Creatinine Ratio 03/09/2017 17.1  7.0 - 25.0 Final   • Anion Gap 03/09/2017 8.1  mmol/L Final   • WBC 03/09/2017 12.36* 4.50 - 10.70 10*3/mm3 Final   • RBC 03/09/2017 3.72* 4.60 - 6.00 10*6/mm3 Final   • Hemoglobin 03/09/2017 11.7* 13.7 - 17.6 g/dL Final   • Hematocrit 03/09/2017 34.8* 40.4 - 52.2 % Final   • MCV 03/09/2017 93.5  79.8 - 96.2 fL Final   • MCH 03/09/2017 31.5  27.0 - 32.7 pg Final   • MCHC 03/09/2017 33.6  32.6 - 36.4 g/dL Final   • RDW 03/09/2017 12.9  11.5 - 14.5 % Final   • RDW-SD 03/09/2017 44.3  37.0 - 54.0 fl Final   • MPV 03/09/2017 11.0  6.0 - 12.0 fL Final   • Platelets 03/09/2017 155  140  - 500 10*3/mm3 Final   • Neutrophil % 03/09/2017 75.4  42.7 - 76.0 % Final   • Lymphocyte % 03/09/2017 13.3* 19.6 - 45.3 % Final   • Monocyte % 03/09/2017 10.6  5.0 - 12.0 % Final   • Eosinophil % 03/09/2017 0.5  0.3 - 6.2 % Final   • Basophil % 03/09/2017 0.0  0.0 - 1.5 % Final   • Immature Grans % 03/09/2017 0.2  0.0 - 0.5 % Final   • Neutrophils, Absolute 03/09/2017 9.31* 1.90 - 8.10 10*3/mm3 Final   • Lymphocytes, Absolute 03/09/2017 1.65  0.90 - 4.80 10*3/mm3 Final   • Monocytes, Absolute 03/09/2017 1.31* 0.20 - 1.20 10*3/mm3 Final   • Eosinophils, Absolute 03/09/2017 0.06  0.00 - 0.70 10*3/mm3 Final   • Basophils, Absolute 03/09/2017 0.00  0.00 - 0.20 10*3/mm3 Final   • Immature Grans, Absolute 03/09/2017 0.03  0.00 - 0.03 10*3/mm3 Final   • Glucose 03/09/2017 139* 70 - 130 mg/dL Final   • Glucose 03/09/2017 169* 70 - 130 mg/dL Final       Xr Knee 1 Or 2 View Left    Result Date: 3/7/2017  Narrative: Left knee  HISTORY knee replacement.  2 views of the left knee demonstrates a total knee prosthesis which appears to be in satisfactory position. There is no evidence of fracture or dislocation  This report was finalized on 3/7/2017 10:12 AM by Dr. Sathya Pyle MD.      Xr Chest Pa & Lateral    Result Date: 2/27/2017  Narrative: TWO-VIEW CHEST  HISTORY: Morbidly obese 66-year-old male preop left knee surgery with history of hypertension  COMPARISON: (none available)  FINDINGS: 1. Elevation of the right hemidiaphragm with blunting of the right lateral costophrenic angle probably chronic, no posterior effusion. 2. Diffuse spondylosis with compression deformities of the spine, not acute. 3. Borderline cardiac enlargement. 4. Right axillary dissection, no additional pertinent history provided. 5. The lungs appear clear of acute process.  This report was finalized on 2/27/2017 4:15 PM by Dr. Clifford Rivas MD.        Discharge and Follow up Instructions:     Total Knee Joint Replacement Discharge Instructions:    I.  ACTIVITIES:  1. Exercises:  ? Complete exercise program as taught by the hospital physical therapist 2 times per day  ? Exercise program will be advanced by your home health physical therapist  ? During the day be up ambulating every 2 hours (while awake) for short distances  ? Complete the ankle pump exercises at least 10 times per hour (while awake)  ? Elevate legs most of the day the first week post operatively and thereafter elevate legs when in bed and for at least 30 minutes during the day.   ? Caution must be taken to avoid pillow placement under the bend of the knee as this can led to flexion contractures of the knee. Pillow placement under the heel is encouraged.  ? Use cold packs 20-30 minutes approximately 5 times per day. This should be done before and after completing your exercises and at any time you are experiencing pain/ stiffness in your operative extremity.      2. Activities of Daily Living:  ? No tub baths, hot tubs, or swimming pools for 4 weeks  ? May shower and let water run over the incision on post-operative day #5 if no drainage. Do not scrub or rub the incision. Simply let the water run over the incision and pat dry.    II. Restrictions  ? Do not cross legs or kneel  ? Your surgeon will discuss with you when you will be able to drive again. Usual guidelines are you are to be off pain medications prior to driving.  ? Weight bearing is as tolerated  ? First week stay inside on even terrain. May go up and down stairs one stair at a time utilizing the hand rail.  ? After one week, you may venture outside.    III. Precautions:  ? Everyone that comes near you should wash their hands  ? No elective dental, genital-urinary, or colon procedures or surgical procedures for 12 weeks after surgery unless absolutely necessary.  ?  If dental work or surgical procedure is deemed absolutely necessary, you will need to contact your surgeon as you will need to take antibiotics 1 hour prior to any dental  work (including teeth cleanings).  ? Please discuss with your surgeon prophylactic antibiotics as the length of time this intervention will be necessary for you varies with each patient’s health history and situation.  ? Avoid sick people. If you must be around someone who is ill, they should wear a mask.  ? Avoid visits to the Emergency Room or Urgent Care. If you feel you need to go to the emergency room, please notify your surgeon.    ? Stockings are to be worn for one week after surgery and are to be placed on in the morning and removed at night. Observe your skin when stocking is removed for any problems. Monitor the stockings to ensure that any swelling is not causing the stockings to become too tight. In this case, remove stockings immediately.    IV. INCISION CARE:  ? Wash your hands prior to dressing changes  ? Change the dressing as needed to keep incision clean and dry. Utilize dry gauze and paper tape. Avoid touching the side of the gauze that goes against the incision with your hands.  ? No creams or ointments to the incision  ? May remove dressing once the incision is free of drainage  ? Do not touch or pick at the incision  ? Check incision every day and notify surgeon immediately if any of the following signs or symptoms are noted:  o Increase in redness  o Increase in swelling around the incision and of the entire extremity  o Increase in pain  o Drainage oozing from the incision  o Pulling apart of the edges of the incision  o Increase in overall body temperature (greater than 100.5 degrees)  ? Your  Staples will be removed between 10-14 days postoperation.  This may be done by either the home health nurse, rehabilitation nurse or during your return visit to Dr. Blackwood's office.  You will then be instructed on how to care for the incision.  (Please call the office if your staples have not been removed within 14 days after surgery).    V. Medications:   1. Anticoagulants: You will be discharged on  an anticoagulant. This is a prophylactic medication that helps prevent blood clots during your post-operative period.  You will be on Aspirin 325 mg twice daily for 21 days.   ? While taking the anticoagulant, you should avoid taking any additional aspirin, ibuprofen (Advil or Motrin), Aleve (Naprosyn) or other non-steroidal anti-inflammatory medications.   ? Notify surgeon immediately if any trell bleeding is noted in the urine, stool, emesis, or from the nose or the incision. Blood in the stool will often appear as black rather than red. Blood in urine may appear as pink. Blood in emesis may appear as brown/black like coffee grounds.  ? You will need to apply pressure for longer periods of time to any cuts or abrasions to stop bleeding  ? Avoid alcohol while taking anticoagulants    2. Stool Softeners: You will be at greater risk of constipation after surgery due to being less mobile and the pain medications.   ? Take stool softeners as instructed by your surgeon while on pain medications. Over the counter Colace 100 mg 1-2 capsules twice daily.   ? If stools become too loose or too frequent, please decreases the dosage or stop the stool softener.  ? If constipation occurs despite use of stool softeners, you are to continue the stool softeners and add a laxative (Milk of Magnesia 1 ounce daily as needed).  ? Dulcolax oral tabs or suppository, or a fleets enema can also be utilized for constipation and can be obtained over the counter.   ? If above interventions are unsuccessful in inducing bowel movements, please contact your surgeon's office / family physician's office.  ? Drink plenty of fluids, and eat fruits and vegetables during your recovery time    3. Pain Medications utilized after surgery are narcotics and the law requires that the following information be given to all patients that are prescribed narcotics:  ? CLASSIFICATION: Pain medications are called Opioids and are narcotics  ? LEGALITIES: It is  illegal to share narcotics with others and to drive within 24 hours of taking narcotics  ? POTENTIAL SIDE EFFECTS: Potential side effects of opioids include: nausea, vomiting, itching, dizziness, drowsiness, dry mouth, constipation, and difficulty urinating.  ? POTENTIAL ADVERSE EFFECTS:   o Opioid tolerance can develop with use of pain medications and this simply means that it requires more and more of the medication to control pain; however, this is seen more in patients that use opioids for longer periods of time.  o Opioid dependence can develop with use of Opioids and this simply means that to stop the medication can cause withdrawal symptoms; however, this is seen with patients that use Opioids for longer periods of time.  o Opioid addiction can develop with use of Opioids and the incidence of this is very unlikely in patients who take the medications as ordered and stop the medications as instructed.  o Opioid overdose can be dangerous, but is unlikely when the medication is taken as ordered and stopped when ordered. It is important not to mix opioids with alcohol or with and type of sedative such as Benadryl as this can lead to over sedation and respiratory difficulty.  ? DOSAGE:   o Pain medications will need to be taken consistently for the first week to decrease pain and promote adequate pain relief and participation in physical therapy.  o After the initial surgical pain begins to resolve, you may begin to decrease the pain medication. By the end of 6 weeks, you should be off of pain medications.  o Refills will not be given by the office during evening hours, on weekends, or after 6 weeks post-op.  o To seek refills on pain medications during the initial 6 week post-operative period, you must call the office 48 hours in advance to request the refill. The office will then notify you when to  the prescription. DO NOT wait until you are out of the medication to request a refill.    V. FOLLOW-UP  VISITS:  ? You will need to follow up in the office with your surgeon in 2 weeks March 20, 2017. Please call this number [phone] to schedule this appointment.  ? If you have any concerns or suspected complications prior to your follow up visit, please call your surgeons office. Do not wait until your appointment time if you suspect complications. These will need to be addressed in the office promptly.      Date: 3/9/2017    Jovana Blackwood MD    CC: Matt Yan Jr., MD; Jovana Blackwood MD     EMR Dragon/Transcription disclaimer:   Much of this encounter note is an electronic transcription/translation of spoken   language to printed text. The electronic translation of spoken language may permit erroneous, or at times, nonsensical words or phrases to be inadvertently transcribed; Although I have reviewed the note for such errors, some may still exist.

## 2017-10-09 ENCOUNTER — TRANSCRIBE ORDERS (OUTPATIENT)
Dept: ADMINISTRATIVE | Facility: HOSPITAL | Age: 67
End: 2017-10-09

## 2017-10-09 DIAGNOSIS — M54.5 LOW BACK PAIN, UNSPECIFIED BACK PAIN LATERALITY, UNSPECIFIED CHRONICITY, WITH SCIATICA PRESENCE UNSPECIFIED: Primary | ICD-10-CM

## 2017-10-19 ENCOUNTER — ANESTHESIA (OUTPATIENT)
Dept: PAIN MEDICINE | Facility: HOSPITAL | Age: 67
End: 2017-10-19

## 2017-10-19 ENCOUNTER — ANESTHESIA EVENT (OUTPATIENT)
Dept: PAIN MEDICINE | Facility: HOSPITAL | Age: 67
End: 2017-10-19

## 2017-10-19 ENCOUNTER — TRANSCRIBE ORDERS (OUTPATIENT)
Dept: PAIN MEDICINE | Facility: HOSPITAL | Age: 67
End: 2017-10-19

## 2017-10-19 ENCOUNTER — HOSPITAL ENCOUNTER (OUTPATIENT)
Dept: GENERAL RADIOLOGY | Facility: HOSPITAL | Age: 67
Discharge: HOME | End: 2017-10-19

## 2017-10-19 ENCOUNTER — HOSPITAL ENCOUNTER (OUTPATIENT)
Dept: PAIN MEDICINE | Facility: HOSPITAL | Age: 67
Discharge: HOME | End: 2017-10-19
Attending: ORTHOPAEDIC SURGERY | Admitting: ORTHOPAEDIC SURGERY

## 2017-10-19 VITALS
DIASTOLIC BLOOD PRESSURE: 69 MMHG | OXYGEN SATURATION: 98 % | BODY MASS INDEX: 34.36 KG/M2 | HEART RATE: 69 BPM | WEIGHT: 240 LBS | HEIGHT: 70 IN | SYSTOLIC BLOOD PRESSURE: 125 MMHG | RESPIRATION RATE: 16 BRPM

## 2017-10-19 DIAGNOSIS — M54.5 LOW BACK PAIN, UNSPECIFIED BACK PAIN LATERALITY, UNSPECIFIED CHRONICITY, WITH SCIATICA PRESENCE UNSPECIFIED: Primary | ICD-10-CM

## 2017-10-19 DIAGNOSIS — M54.5 LOW BACK PAIN, UNSPECIFIED BACK PAIN LATERALITY, UNSPECIFIED CHRONICITY, WITH SCIATICA PRESENCE UNSPECIFIED: ICD-10-CM

## 2017-10-19 DIAGNOSIS — R52 PAIN: ICD-10-CM

## 2017-10-19 PROCEDURE — 77003 FLUOROGUIDE FOR SPINE INJECT: CPT

## 2017-10-19 PROCEDURE — 25010000002 FENTANYL CITRATE (PF) 100 MCG/2ML SOLUTION: Performed by: ANESTHESIOLOGY

## 2017-10-19 PROCEDURE — C1755 CATHETER, INTRASPINAL: HCPCS

## 2017-10-19 PROCEDURE — 25010000002 METHYLPREDNISOLONE PER 80 MG: Performed by: ANESTHESIOLOGY

## 2017-10-19 PROCEDURE — 25010000002 MIDAZOLAM PER 1 MG: Performed by: ANESTHESIOLOGY

## 2017-10-19 RX ORDER — DICLOFENAC SODIUM 75 MG/1
75 TABLET, DELAYED RELEASE ORAL 2 TIMES DAILY
COMMUNITY
End: 2018-02-20

## 2017-10-19 RX ORDER — MIDAZOLAM HYDROCHLORIDE 1 MG/ML
1 INJECTION INTRAMUSCULAR; INTRAVENOUS AS NEEDED
Status: DISCONTINUED | OUTPATIENT
Start: 2017-10-19 | End: 2017-10-20 | Stop reason: HOSPADM

## 2017-10-19 RX ORDER — MULTIVITAMIN WITH IRON
250 TABLET ORAL DAILY
COMMUNITY

## 2017-10-19 RX ORDER — FENTANYL CITRATE 50 UG/ML
50 INJECTION, SOLUTION INTRAMUSCULAR; INTRAVENOUS AS NEEDED
Status: DISCONTINUED | OUTPATIENT
Start: 2017-10-19 | End: 2017-10-20 | Stop reason: HOSPADM

## 2017-10-19 RX ORDER — SODIUM CHLORIDE 0.9 % (FLUSH) 0.9 %
1-10 SYRINGE (ML) INJECTION AS NEEDED
Status: DISCONTINUED | OUTPATIENT
Start: 2017-10-19 | End: 2017-10-20 | Stop reason: HOSPADM

## 2017-10-19 RX ORDER — ASPIRIN 81 MG/1
81 TABLET ORAL DAILY
COMMUNITY

## 2017-10-19 RX ORDER — LIDOCAINE HYDROCHLORIDE 10 MG/ML
1 INJECTION, SOLUTION INFILTRATION; PERINEURAL ONCE AS NEEDED
Status: DISCONTINUED | OUTPATIENT
Start: 2017-10-19 | End: 2017-10-20 | Stop reason: HOSPADM

## 2017-10-19 RX ORDER — METHYLPREDNISOLONE ACETATE 80 MG/ML
80 INJECTION, SUSPENSION INTRA-ARTICULAR; INTRALESIONAL; INTRAMUSCULAR; SOFT TISSUE ONCE
Status: COMPLETED | OUTPATIENT
Start: 2017-10-19 | End: 2017-10-19

## 2017-10-19 RX ADMIN — METHYLPREDNISOLONE ACETATE 80 MG: 80 INJECTION, SUSPENSION INTRA-ARTICULAR; INTRALESIONAL; INTRAMUSCULAR; SOFT TISSUE at 08:35

## 2017-10-19 RX ADMIN — FENTANYL CITRATE 50 MCG: 50 INJECTION INTRAMUSCULAR; INTRAVENOUS at 08:32

## 2017-10-19 RX ADMIN — MIDAZOLAM 1 MG: 1 INJECTION INTRAMUSCULAR; INTRAVENOUS at 08:32

## 2017-10-19 NOTE — H&P
[facility]    History and Physical    Patient Name: Nixon Chambers  :  1950  MRN:  [id number]  Date of Admission: 10/19/2017    Subjective     Patient is a 66 y.o. male presents with chief complaint of severe low back pain.  Onset of symptoms was gradual starting 6 months ago.  Symptoms are associated/aggravated by activity or standing. Symptoms improve with lying down.  Patient has done PT without improvement.  Pain radiate to right foot, with numbness in right foot.  No weakness or bowel changes.  MRI showed LDD worst at L45.    The following portions of the patients history were reviewed and updated as appropriate: current medications, allergies, past medical history, past surgical history, past family history, past social history and problem list                Objective     Past Medical History:   Past Medical History:   Diagnosis Date   • Cancer     MELANOMA   • Diabetes mellitus    • Hard to intubate     WITH ROTATOR CUFF SURGERY, WAS INTUBATED WHILE AWAKE X1 WITH MELANOMA SURGERY   • High cholesterol    • Hypertension    • Knee pain, left    • Sleep apnea     WEARS CPAP     Past Surgical History:   Past Surgical History:   Procedure Laterality Date   • KNEE ARTHROPLASTY Right    • [facility] TOTAL KNEE ARTHROPLASTY Left 3/7/2017    Procedure: LT TOTAL KNEE ARTHROPLASTY;  Surgeon: Jovana Blackwood MD;  Location: [facility];  Service: Orthopedics   • ROTATOR CUFF REPAIR Right    • SKIN CANCER EXCISION      MID AND LOWER BACK   • SKIN GRAFT      BACK     Family History: History reviewed. No pertinent family history.  Social History:   Social History   Substance Use Topics   • Smoking status: Never Smoker   • Smokeless tobacco: Never Used   • Alcohol use No       Vital Signs Range for the last 24 hours  Temperature:     Temp Source:     BP: BP: (134)/(92) 134/92   Pulse: Heart Rate:  [74] 74   Respirations: Resp:  [16] 16   SPO2: SpO2:  [97 %] 97 %   O2 Amount (l/min):     O2  "Devices O2 Device: room air   Weight: Weight:  [240 lb (109 kg)] 240 lb (109 kg)     Flowsheet Rows         First Filed Value    Admission Height  70\" (177.8 cm) Documented at 10/19/2017 0811    Admission Weight  240 lb (109 kg) Documented at 10/19/2017 0811          --------------------------------------------------------------------------------    Current Outpatient Prescriptions   Medication Sig Dispense Refill   • atorvastatin (LIPITOR) 20 MG tablet Take 20 mg by mouth Daily.     • B Complex Vitamins (VITAMIN B COMPLEX) capsule capsule Take 1 capsule by mouth Daily.     • diclofenac (VOLTAREN) 75 MG EC tablet Take 75 mg by mouth 2 (Two) Times a Day.     • doxazosin (CARDURA) 2 MG tablet Take 2 mg by mouth Every Night.     • gabapentin (NEURONTIN) 300 MG capsule Take 300 mg by mouth Daily.     • lisinopril-hydrochlorothiazide (PRINZIDE,ZESTORETIC) 20-12.5 MG per tablet Take 1 tablet by mouth Daily.     • Magnesium 250 MG tablet Take 250 mg by mouth Daily.     • metFORMIN (GLUCOPHAGE) 500 MG tablet Take 500 mg by mouth 2 (Two) Times a Day With Meals.     • Zinc 50 MG tablet Take 1 tablet by mouth Daily.     • aspirin 81 MG EC tablet Take 81 mg by mouth Daily.     • Omega-3 Fatty Acids (FISH OIL) 1000 MG capsule capsule Take 1,000 mg by mouth Daily With Breakfast.       Current Facility-Administered Medications   Medication Dose Route Frequency Provider Last Rate Last Dose   • sodium chloride 0.9 % flush 1-10 mL  1-10 mL Intravenous PRN Mac Zuh DO           --------------------------------------------------------------------------------  Assessment/Plan      Anesthesia Evaluation     Patient summary reviewed and Nursing notes reviewed          Airway   Mallampati: III  TM distance: >3 FB  Neck ROM: full  no difficulty expected  Dental - normal exam     Pulmonary     breath sounds clear to auscultation  (+) sleep apnea on CPAP,   Cardiovascular - normal exam  Exercise tolerance: good (4-7 METS)    Rhythm: " regular  Rate: normal    (+) hypertension, hyperlipidemia      Neuro/Psych- negative ROS and neuro exam normal  GI/Hepatic/Renal/Endo    (+)  diabetes mellitus,     Musculoskeletal (-) normal exam    (+) back pain, Straight Leg Test,   Abdominal  - normal exam   Substance History - negative use     OB/GYN          Other      history of cancer remission                               Diagnosis and Plan    Treatment Plan  ASA 3      Procedures: Lumbar Epidural Steroid Injection(LESI), With fluoroscopy,       Anesthetic plan and risks discussed with patient.          Diagnosis     * Lumbar disc displacement without myelopathy [M51.26]

## 2017-10-19 NOTE — ANESTHESIA PROCEDURE NOTES
PAIN Epidural block    Patient location during procedure: pain clinic  Start Time: 10/19/2017 8:30 AM  Stop Time: 10/19/2017 8:37 AM  Indication:procedure for pain  Performed By  Anesthesiologist: TRACEY SALDANA  Preanesthetic Checklist  Completed: patient identified, site marked, surgical consent, pre-op evaluation, timeout performed, IV checked, risks and benefits discussed and monitors and equipment checked  Additional Notes  LDDD/LDD  Prep:  Pt Position:prone  Sterile Tech:cap, gloves, mask and sterile barrier  Prep:chlorhexidine gluconate and isopropyl alcohol  Monitoring:blood pressure monitoring, continuous pulse oximetry and EKG  Procedure:  Approach:right paramedian  Guidance: fluoroscopy  Location:lumbar  Level:4-5  Needle Type:Tuohy  Needle Gauge:20  Aspiration:negative  Medications:  Depomedrol:80  Preservative Free Saline:3mL    Post Assessment:  Dressing:secured with tape  Pt Tolerance:patient tolerated the procedure well with no apparent complications  Complications:no

## 2017-11-16 ENCOUNTER — ANESTHESIA EVENT (OUTPATIENT)
Dept: PAIN MEDICINE | Facility: HOSPITAL | Age: 67
End: 2017-11-16

## 2017-11-16 ENCOUNTER — ANESTHESIA (OUTPATIENT)
Dept: PAIN MEDICINE | Facility: HOSPITAL | Age: 67
End: 2017-11-16

## 2017-11-16 ENCOUNTER — HOSPITAL ENCOUNTER (OUTPATIENT)
Dept: GENERAL RADIOLOGY | Facility: HOSPITAL | Age: 67
Discharge: HOME | End: 2017-11-16

## 2017-11-16 ENCOUNTER — TRANSCRIBE ORDERS (OUTPATIENT)
Dept: PAIN MEDICINE | Facility: HOSPITAL | Age: 67
End: 2017-11-16

## 2017-11-16 ENCOUNTER — HOSPITAL ENCOUNTER (OUTPATIENT)
Dept: PAIN MEDICINE | Facility: HOSPITAL | Age: 67
Discharge: HOME | End: 2017-11-16
Admitting: ORTHOPAEDIC SURGERY

## 2017-11-16 VITALS
SYSTOLIC BLOOD PRESSURE: 128 MMHG | TEMPERATURE: 98.2 F | WEIGHT: 240 LBS | HEIGHT: 70 IN | HEART RATE: 69 BPM | DIASTOLIC BLOOD PRESSURE: 78 MMHG | BODY MASS INDEX: 34.36 KG/M2 | RESPIRATION RATE: 16 BRPM | OXYGEN SATURATION: 95 %

## 2017-11-16 DIAGNOSIS — M54.5 LOW BACK PAIN, UNSPECIFIED BACK PAIN LATERALITY, UNSPECIFIED CHRONICITY, WITH SCIATICA PRESENCE UNSPECIFIED: ICD-10-CM

## 2017-11-16 DIAGNOSIS — R52 PAIN: ICD-10-CM

## 2017-11-16 DIAGNOSIS — M54.5 LOW BACK PAIN, UNSPECIFIED BACK PAIN LATERALITY, UNSPECIFIED CHRONICITY, WITH SCIATICA PRESENCE UNSPECIFIED: Primary | ICD-10-CM

## 2017-11-16 PROCEDURE — 25010000002 FENTANYL CITRATE (PF) 100 MCG/2ML SOLUTION: Performed by: ANESTHESIOLOGY

## 2017-11-16 PROCEDURE — 77003 FLUOROGUIDE FOR SPINE INJECT: CPT

## 2017-11-16 PROCEDURE — 25010000002 MIDAZOLAM PER 1 MG: Performed by: ANESTHESIOLOGY

## 2017-11-16 PROCEDURE — 25010000002 METHYLPREDNISOLONE PER 80 MG: Performed by: ANESTHESIOLOGY

## 2017-11-16 PROCEDURE — C1755 CATHETER, INTRASPINAL: HCPCS

## 2017-11-16 PROCEDURE — 0 IOPAMIDOL 41 % SOLUTION: Performed by: ANESTHESIOLOGY

## 2017-11-16 RX ORDER — FENTANYL CITRATE 50 UG/ML
50 INJECTION, SOLUTION INTRAMUSCULAR; INTRAVENOUS AS NEEDED
Status: DISCONTINUED | OUTPATIENT
Start: 2017-11-16 | End: 2017-11-17 | Stop reason: HOSPADM

## 2017-11-16 RX ORDER — SODIUM CHLORIDE 0.9 % (FLUSH) 0.9 %
1-10 SYRINGE (ML) INJECTION AS NEEDED
Status: DISCONTINUED | OUTPATIENT
Start: 2017-11-16 | End: 2017-11-17 | Stop reason: HOSPADM

## 2017-11-16 RX ORDER — MIDAZOLAM HYDROCHLORIDE 1 MG/ML
1 INJECTION INTRAMUSCULAR; INTRAVENOUS AS NEEDED
Status: DISCONTINUED | OUTPATIENT
Start: 2017-11-16 | End: 2017-11-17 | Stop reason: HOSPADM

## 2017-11-16 RX ORDER — LIDOCAINE HYDROCHLORIDE 10 MG/ML
1 INJECTION, SOLUTION INFILTRATION; PERINEURAL ONCE AS NEEDED
Status: DISCONTINUED | OUTPATIENT
Start: 2017-11-16 | End: 2017-11-17 | Stop reason: HOSPADM

## 2017-11-16 RX ORDER — METHYLPREDNISOLONE ACETATE 80 MG/ML
80 INJECTION, SUSPENSION INTRA-ARTICULAR; INTRALESIONAL; INTRAMUSCULAR; SOFT TISSUE ONCE
Status: COMPLETED | OUTPATIENT
Start: 2017-11-16 | End: 2017-11-16

## 2017-11-16 RX ADMIN — MIDAZOLAM 1 MG: 1 INJECTION INTRAMUSCULAR; INTRAVENOUS at 08:09

## 2017-11-16 RX ADMIN — METHYLPREDNISOLONE ACETATE 80 MG: 80 INJECTION, SUSPENSION INTRA-ARTICULAR; INTRALESIONAL; INTRAMUSCULAR; SOFT TISSUE at 08:14

## 2017-11-16 RX ADMIN — IOPAMIDOL 10 ML: 408 INJECTION, SOLUTION INTRATHECAL at 08:14

## 2017-11-16 RX ADMIN — FENTANYL CITRATE 50 MCG: 50 INJECTION INTRAMUSCULAR; INTRAVENOUS at 08:09

## 2017-11-16 NOTE — INTERVAL H&P NOTE
[facility]  H&P reviewed. No changes since last visit.  Patient states   50-75% improvement since the last procedure/injection.  Pain 0-3/10 radiate to right foot.    Diagnosis     * DDD (degenerative disc disease), lumbar [M51.36]     * Lumbar disc displacement without myelopathy [M51.26]      Airway assessed since last visit. Airway class equals: 2.

## 2017-11-16 NOTE — H&P (VIEW-ONLY)
[facility]    History and Physical    Patient Name: Nixon Chambers  :  1950  MRN:  [id number]  Date of Admission: 10/19/2017    Subjective     Patient is a 66 y.o. male presents with chief complaint of severe low back pain.  Onset of symptoms was gradual starting 6 months ago.  Symptoms are associated/aggravated by activity or standing. Symptoms improve with lying down.  Patient has done PT without improvement.  Pain radiate to right foot, with numbness in right foot.  No weakness or bowel changes.  MRI showed LDD worst at L45.    The following portions of the patients history were reviewed and updated as appropriate: current medications, allergies, past medical history, past surgical history, past family history, past social history and problem list                Objective     Past Medical History:   Past Medical History:   Diagnosis Date   • Cancer     MELANOMA   • Diabetes mellitus    • Hard to intubate     WITH ROTATOR CUFF SURGERY, WAS INTUBATED WHILE AWAKE X1 WITH MELANOMA SURGERY   • High cholesterol    • Hypertension    • Knee pain, left    • Sleep apnea     WEARS CPAP     Past Surgical History:   Past Surgical History:   Procedure Laterality Date   • KNEE ARTHROPLASTY Right    • [place] TOTAL KNEE ARTHROPLASTY Left 3/7/2017    Procedure: LT TOTAL KNEE ARTHROPLASTY;  Surgeon: Jovana Blackwood MD;  Location: [facility];  Service: Orthopedics   • ROTATOR CUFF REPAIR Right    • SKIN CANCER EXCISION      MID AND LOWER BACK   • SKIN GRAFT      BACK     Family History: History reviewed. No pertinent family history.  Social History:   Social History   Substance Use Topics   • Smoking status: Never Smoker   • Smokeless tobacco: Never Used   • Alcohol use No       Vital Signs Range for the last 24 hours  Temperature:     Temp Source:     BP: BP: (134)/(92) 134/92   Pulse: Heart Rate:  [74] 74   Respirations: Resp:  [16] 16   SPO2: SpO2:  [97 %] 97 %   O2 Amount (l/min):     O2  "Devices O2 Device: room air   Weight: Weight:  [240 lb (109 kg)] 240 lb (109 kg)     Flowsheet Rows         First Filed Value    Admission Height  70\" (177.8 cm) Documented at 10/19/2017 0811    Admission Weight  240 lb (109 kg) Documented at 10/19/2017 0811          --------------------------------------------------------------------------------    Current Outpatient Prescriptions   Medication Sig Dispense Refill   • atorvastatin (LIPITOR) 20 MG tablet Take 20 mg by mouth Daily.     • B Complex Vitamins (VITAMIN B COMPLEX) capsule capsule Take 1 capsule by mouth Daily.     • diclofenac (VOLTAREN) 75 MG EC tablet Take 75 mg by mouth 2 (Two) Times a Day.     • doxazosin (CARDURA) 2 MG tablet Take 2 mg by mouth Every Night.     • gabapentin (NEURONTIN) 300 MG capsule Take 300 mg by mouth Daily.     • lisinopril-hydrochlorothiazide (PRINZIDE,ZESTORETIC) 20-12.5 MG per tablet Take 1 tablet by mouth Daily.     • Magnesium 250 MG tablet Take 250 mg by mouth Daily.     • metFORMIN (GLUCOPHAGE) 500 MG tablet Take 500 mg by mouth 2 (Two) Times a Day With Meals.     • Zinc 50 MG tablet Take 1 tablet by mouth Daily.     • aspirin 81 MG EC tablet Take 81 mg by mouth Daily.     • Omega-3 Fatty Acids (FISH OIL) 1000 MG capsule capsule Take 1,000 mg by mouth Daily With Breakfast.       Current Facility-Administered Medications   Medication Dose Route Frequency Provider Last Rate Last Dose   • sodium chloride 0.9 % flush 1-10 mL  1-10 mL Intravenous PRN Mac Zhu DO           --------------------------------------------------------------------------------  Assessment/Plan      Anesthesia Evaluation     Patient summary reviewed and Nursing notes reviewed          Airway   Mallampati: III  TM distance: >3 FB  Neck ROM: full  no difficulty expected  Dental - normal exam     Pulmonary     breath sounds clear to auscultation  (+) sleep apnea on CPAP,   Cardiovascular - normal exam  Exercise tolerance: good (4-7 METS)    Rhythm: " regular  Rate: normal    (+) hypertension, hyperlipidemia      Neuro/Psych- negative ROS and neuro exam normal  GI/Hepatic/Renal/Endo    (+)  diabetes mellitus,     Musculoskeletal (-) normal exam    (+) back pain, Straight Leg Test,   Abdominal  - normal exam   Substance History - negative use     OB/GYN          Other      history of cancer remission                               Diagnosis and Plan    Treatment Plan  ASA 3      Procedures: Lumbar Epidural Steroid Injection(LESI), With fluoroscopy,       Anesthetic plan and risks discussed with patient.          Diagnosis     * Lumbar disc displacement without myelopathy [M51.26]

## 2018-01-25 ENCOUNTER — APPOINTMENT (OUTPATIENT)
Dept: PAIN MEDICINE | Facility: HOSPITAL | Age: 68
End: 2018-01-25

## 2018-02-16 ENCOUNTER — TRANSCRIBE ORDERS (OUTPATIENT)
Dept: PAIN MEDICINE | Facility: HOSPITAL | Age: 68
End: 2018-02-16

## 2018-02-16 DIAGNOSIS — M54.5 LOW BACK PAIN, UNSPECIFIED BACK PAIN LATERALITY, UNSPECIFIED CHRONICITY, WITH SCIATICA PRESENCE UNSPECIFIED: Primary | ICD-10-CM

## 2018-02-20 ENCOUNTER — ANESTHESIA (OUTPATIENT)
Dept: PAIN MEDICINE | Facility: HOSPITAL | Age: 68
End: 2018-02-20

## 2018-02-20 ENCOUNTER — HOSPITAL ENCOUNTER (OUTPATIENT)
Dept: PAIN MEDICINE | Facility: HOSPITAL | Age: 68
Discharge: HOME | End: 2018-02-20
Admitting: ORTHOPAEDIC SURGERY

## 2018-02-20 ENCOUNTER — ANESTHESIA EVENT (OUTPATIENT)
Dept: PAIN MEDICINE | Facility: HOSPITAL | Age: 68
End: 2018-02-20

## 2018-02-20 ENCOUNTER — HOSPITAL ENCOUNTER (OUTPATIENT)
Dept: GENERAL RADIOLOGY | Facility: HOSPITAL | Age: 68
Discharge: HOME | End: 2018-02-20

## 2018-02-20 VITALS
SYSTOLIC BLOOD PRESSURE: 120 MMHG | TEMPERATURE: 97.4 F | OXYGEN SATURATION: 97 % | DIASTOLIC BLOOD PRESSURE: 77 MMHG | HEART RATE: 68 BPM | RESPIRATION RATE: 16 BRPM

## 2018-02-20 DIAGNOSIS — M54.5 LOW BACK PAIN, UNSPECIFIED BACK PAIN LATERALITY, UNSPECIFIED CHRONICITY, WITH SCIATICA PRESENCE UNSPECIFIED: ICD-10-CM

## 2018-02-20 DIAGNOSIS — R52 PAIN: ICD-10-CM

## 2018-02-20 PROCEDURE — 0 IOPAMIDOL 41 % SOLUTION: Performed by: ANESTHESIOLOGY

## 2018-02-20 PROCEDURE — C1755 CATHETER, INTRASPINAL: HCPCS

## 2018-02-20 PROCEDURE — 77003 FLUOROGUIDE FOR SPINE INJECT: CPT

## 2018-02-20 PROCEDURE — 25010000002 FENTANYL CITRATE (PF) 100 MCG/2ML SOLUTION: Performed by: ANESTHESIOLOGY

## 2018-02-20 PROCEDURE — 25010000002 MIDAZOLAM PER 1 MG: Performed by: ANESTHESIOLOGY

## 2018-02-20 PROCEDURE — 25010000002 METHYLPREDNISOLONE PER 80 MG: Performed by: ANESTHESIOLOGY

## 2018-02-20 RX ORDER — METHYLPREDNISOLONE ACETATE 80 MG/ML
80 INJECTION, SUSPENSION INTRA-ARTICULAR; INTRALESIONAL; INTRAMUSCULAR; SOFT TISSUE ONCE
Status: COMPLETED | OUTPATIENT
Start: 2018-02-20 | End: 2018-02-20

## 2018-02-20 RX ORDER — LIDOCAINE HYDROCHLORIDE 10 MG/ML
1 INJECTION, SOLUTION INFILTRATION; PERINEURAL ONCE AS NEEDED
Status: DISCONTINUED | OUTPATIENT
Start: 2018-02-20 | End: 2018-02-22 | Stop reason: HOSPADM

## 2018-02-20 RX ORDER — SODIUM CHLORIDE 0.9 % (FLUSH) 0.9 %
1-10 SYRINGE (ML) INJECTION AS NEEDED
Status: CANCELLED | OUTPATIENT
Start: 2018-02-20

## 2018-02-20 RX ORDER — FENTANYL CITRATE 50 UG/ML
50 INJECTION, SOLUTION INTRAMUSCULAR; INTRAVENOUS AS NEEDED
Status: DISCONTINUED | OUTPATIENT
Start: 2018-02-20 | End: 2018-02-22 | Stop reason: HOSPADM

## 2018-02-20 RX ORDER — SODIUM CHLORIDE 0.9 % (FLUSH) 0.9 %
1-10 SYRINGE (ML) INJECTION AS NEEDED
Status: DISCONTINUED | OUTPATIENT
Start: 2018-02-20 | End: 2018-02-22 | Stop reason: HOSPADM

## 2018-02-20 RX ORDER — NAPROXEN 500 MG/1
500 TABLET ORAL 2 TIMES DAILY WITH MEALS
COMMUNITY
End: 2022-09-15

## 2018-02-20 RX ORDER — MIDAZOLAM HYDROCHLORIDE 1 MG/ML
1 INJECTION INTRAMUSCULAR; INTRAVENOUS AS NEEDED
Status: DISCONTINUED | OUTPATIENT
Start: 2018-02-20 | End: 2018-02-22 | Stop reason: HOSPADM

## 2018-02-20 RX ADMIN — MIDAZOLAM 1 MG: 1 INJECTION INTRAMUSCULAR; INTRAVENOUS at 09:18

## 2018-02-20 RX ADMIN — IOPAMIDOL 10 ML: 408 INJECTION, SOLUTION INTRATHECAL at 09:21

## 2018-02-20 RX ADMIN — FENTANYL CITRATE 50 MCG: 50 INJECTION INTRAMUSCULAR; INTRAVENOUS at 09:18

## 2018-02-20 RX ADMIN — METHYLPREDNISOLONE ACETATE 80 MG: 80 INJECTION, SUSPENSION INTRA-ARTICULAR; INTRALESIONAL; INTRAMUSCULAR; SOFT TISSUE at 09:21

## 2018-02-20 NOTE — ANESTHESIA PROCEDURE NOTES
PAIN Epidural block    Patient location during procedure: pain clinic  Start Time: 2/20/2018 9:16 AM  Stop Time: 2/20/2018 9:22 AM  Indication:procedure for pain  Performed By  Anesthesiologist: SOTERO ALBA  Preanesthetic Checklist  Completed: patient identified, surgical consent, pre-op evaluation, timeout performed, IV checked, risks and benefits discussed and monitors and equipment checked  Additional Notes  Diagnosis:  Post-Op Diagnosis Codes:     * Lumbar degenerative disc disease (M51.36)     * Lumbar disc disease (M51.9)     * Lumbar neuritis (M54.16)      Prep:  Pt Position:prone  Sterile Tech:gloves, mask and sterile barrier  Prep:chlorhexidine gluconate and isopropyl alcohol  Monitoring:blood pressure monitoring, continuous pulse oximetry and EKG  Procedure:  Sedation: yes   Approach:right paramedian  Guidance: fluoroscopy  Location:lumbar  Level:4-5  Needle Type:Tuohy  Needle Gauge:20  Aspiration:negative  Test Dose:negative  Medications:  Depomedrol:80 mg  Preservative Free Saline:3mL  Isovue:3mL    Post Assessment:  Dressing:occlusive dressing applied  Pt Tolerance:patient tolerated the procedure well with no apparent complications  Complications:no

## 2018-02-20 NOTE — H&P
INTERVAL HISTORY:    The patient returns for another Lumbar epidural steroid injection today.  They have received 50-60% improvement since their last injection with a pain level of 3-4/10 at its worst recently.    Conservative measures tried in the interim     Exam:  /82 (BP Location: Left arm, Patient Position: Sitting)  Pulse 67  Temp 36.3 °C (97.4 °F) (Oral)   Resp 16  SpO2 98%  Airway Mallampatti 2  Alert and oriented      Diagnosis:  Post-Op Diagnosis Codes:     * Lumbar degenerative disc disease [M51.36]     * Lumbar disc disease [M51.9]     * Lumbar neuritis [M54.16]    Plan:  Lumbar epidural steroid injection under fluoroscopic guidance    I have encouraged them to continue:  1.  Physical therapy exercises at home as prescribed by physical therapy or from the pain clinic handout (given to the patient).  Continuation of these exercises every day, or multiple times per week, even when the patient has good pain relief, was stressed to the patient as a preventative measure to decrease the frequency and severity of future pain episodes.  2.  Continue pain medicines as already prescribed.  If patient not currently taking any, it is recommended to begin Acetaminophen 1000 mg po q 8 hours.  If other medicines containing Acetaminophen are currently prescribed, maintain daily dose at 3000mg.    3.  If they can tolerate NSAIDS, it is recommended to take Ibuprofen 600 mg po q 6 hours for 7 days during pain exacerbations.   Alternatively, they may substitute an NSAID of their choice (e.g. Aleve)  4.  Heat and ice to the affected area as tolerated for pain control.  It was discussed that heating pads can cause burns.  5.  Low impact exercise such as walking or water exercise was recommended to maintain overall health and aid in weight control.   6.  Follow up as needed for subsequent injections.  7.  Patient was counseled to abstain from tobacco products.

## 2019-05-22 ENCOUNTER — TRANSCRIBE ORDERS (OUTPATIENT)
Dept: ADMINISTRATIVE | Facility: HOSPITAL | Age: 69
End: 2019-05-22

## 2019-05-22 DIAGNOSIS — M25.562 CHRONIC PAIN OF LEFT KNEE: Primary | ICD-10-CM

## 2019-05-22 DIAGNOSIS — G89.29 CHRONIC PAIN OF LEFT KNEE: Primary | ICD-10-CM

## 2019-05-28 ENCOUNTER — HOSPITAL ENCOUNTER (OUTPATIENT)
Dept: NUCLEAR MEDICINE | Facility: HOSPITAL | Age: 69
Discharge: HOME | End: 2019-05-28

## 2019-05-28 DIAGNOSIS — G89.29 CHRONIC PAIN OF LEFT KNEE: ICD-10-CM

## 2019-05-28 DIAGNOSIS — M25.562 CHRONIC PAIN OF LEFT KNEE: ICD-10-CM

## 2019-05-28 PROCEDURE — 78306 BONE IMAGING WHOLE BODY: CPT

## 2019-05-28 PROCEDURE — 78315 BONE IMAGING 3 PHASE: CPT

## 2019-05-28 PROCEDURE — A9503 TC99M MEDRONATE: HCPCS | Performed by: ORTHOPAEDIC SURGERY

## 2019-05-28 PROCEDURE — 0 TECHNETIUM MEDRONATE KIT: Performed by: ORTHOPAEDIC SURGERY

## 2019-05-28 RX ORDER — TC 99M MEDRONATE 20 MG/10ML
18.5 INJECTION, POWDER, LYOPHILIZED, FOR SOLUTION INTRAVENOUS
Status: COMPLETED | OUTPATIENT
Start: 2019-05-28 | End: 2019-05-28

## 2019-05-28 RX ADMIN — Medication 18.5 MILLICURIE: at 08:49

## 2021-03-02 ENCOUNTER — IMMUNIZATION (OUTPATIENT)
Dept: VACCINE CLINIC | Facility: HOSPITAL | Age: 71
End: 2021-03-02

## 2021-03-02 PROCEDURE — 0001A: CPT | Performed by: INTERNAL MEDICINE

## 2021-03-02 PROCEDURE — 91300 HC SARSCOV02 VAC 30MCG/0.3ML IM: CPT | Performed by: INTERNAL MEDICINE

## 2021-03-23 ENCOUNTER — IMMUNIZATION (OUTPATIENT)
Dept: VACCINE CLINIC | Facility: HOSPITAL | Age: 71
End: 2021-03-23

## 2021-03-23 PROCEDURE — 0002A: CPT | Performed by: INTERNAL MEDICINE

## 2021-03-23 PROCEDURE — 91300 HC SARSCOV02 VAC 30MCG/0.3ML IM: CPT | Performed by: INTERNAL MEDICINE

## 2021-09-28 ENCOUNTER — HOSPITAL ENCOUNTER (OUTPATIENT)
Dept: CARDIOLOGY | Facility: HOSPITAL | Age: 71
Discharge: HOME | End: 2021-09-28
Admitting: PLASTIC SURGERY

## 2021-09-28 ENCOUNTER — TRANSCRIBE ORDERS (OUTPATIENT)
Dept: LAB | Facility: HOSPITAL | Age: 71
End: 2021-09-28

## 2021-09-28 DIAGNOSIS — Z01.818 PRE-OP TESTING: ICD-10-CM

## 2021-09-28 DIAGNOSIS — Z01.818 PRE-OP TESTING: Primary | ICD-10-CM

## 2021-09-28 LAB — QT INTERVAL: 362 MS

## 2021-09-28 PROCEDURE — 93005 ELECTROCARDIOGRAM TRACING: CPT | Performed by: PLASTIC SURGERY

## 2021-09-28 PROCEDURE — 93010 ELECTROCARDIOGRAM REPORT: CPT | Performed by: INTERNAL MEDICINE

## 2021-10-15 ENCOUNTER — IMMUNIZATION (OUTPATIENT)
Dept: VACCINE CLINIC | Facility: HOSPITAL | Age: 71
End: 2021-10-15

## 2021-10-15 PROCEDURE — 91300 HC SARSCOV02 VAC 30MCG/0.3ML IM: CPT | Performed by: INTERNAL MEDICINE

## 2021-10-15 PROCEDURE — 0004A ADM SARSCOV2 30MCG/0.3ML BOOSTER: CPT | Performed by: INTERNAL MEDICINE

## 2022-09-15 ENCOUNTER — OFFICE VISIT (OUTPATIENT)
Dept: CARDIOLOGY | Facility: CLINIC | Age: 72
End: 2022-09-15

## 2022-09-15 ENCOUNTER — HOSPITAL ENCOUNTER (OUTPATIENT)
Dept: CARDIOLOGY | Facility: HOSPITAL | Age: 72
Discharge: HOME | End: 2022-09-15
Admitting: INTERNAL MEDICINE

## 2022-09-15 VITALS
HEIGHT: 70 IN | HEART RATE: 88 BPM | BODY MASS INDEX: 34.07 KG/M2 | DIASTOLIC BLOOD PRESSURE: 60 MMHG | WEIGHT: 238 LBS | SYSTOLIC BLOOD PRESSURE: 100 MMHG

## 2022-09-15 DIAGNOSIS — I10 PRIMARY HYPERTENSION: ICD-10-CM

## 2022-09-15 DIAGNOSIS — E78.2 MIXED HYPERLIPIDEMIA: ICD-10-CM

## 2022-09-15 DIAGNOSIS — R07.89 CHEST PAIN, ATYPICAL: ICD-10-CM

## 2022-09-15 DIAGNOSIS — R93.1 AGATSTON CAC SCORE 200-399: Primary | ICD-10-CM

## 2022-09-15 DIAGNOSIS — E11.9 TYPE 2 DIABETES MELLITUS WITHOUT COMPLICATION, WITHOUT LONG-TERM CURRENT USE OF INSULIN: ICD-10-CM

## 2022-09-15 PROBLEM — E78.5 HYPERLIPIDEMIA: Status: ACTIVE | Noted: 2022-09-15

## 2022-09-15 LAB
BH CV STRESS BP STAGE 1: NORMAL
BH CV STRESS BP STAGE 2: NORMAL
BH CV STRESS DURATION MIN STAGE 1: 3
BH CV STRESS DURATION MIN STAGE 2: 3
BH CV STRESS DURATION SEC STAGE 1: 0
BH CV STRESS DURATION SEC STAGE 2: 0
BH CV STRESS GRADE STAGE 1: 10
BH CV STRESS GRADE STAGE 2: 12
BH CV STRESS HR STAGE 1: 126
BH CV STRESS HR STAGE 2: 148
BH CV STRESS METS STAGE 1: 5
BH CV STRESS METS STAGE 2: 7.5
BH CV STRESS PROTOCOL 1: NORMAL
BH CV STRESS RECOVERY BP: NORMAL MMHG
BH CV STRESS RECOVERY HR: 103 BPM
BH CV STRESS SPEED STAGE 1: 1.7
BH CV STRESS SPEED STAGE 2: 2.5
BH CV STRESS STAGE 1: 1
BH CV STRESS STAGE 2: 2
MAXIMAL PREDICTED HEART RATE: 151 BPM
PERCENT MAX PREDICTED HR: 98.01 %
STRESS BASELINE BP: NORMAL MMHG
STRESS BASELINE HR: 100 BPM
STRESS PERCENT HR: 115 %
STRESS POST ESTIMATED WORKLOAD: 7.5 METS
STRESS POST EXERCISE DUR MIN: 6 MIN
STRESS POST EXERCISE DUR SEC: 0 SEC
STRESS POST PEAK BP: NORMAL MMHG
STRESS POST PEAK HR: 148 BPM
STRESS TARGET HR: 128 BPM

## 2022-09-15 PROCEDURE — 93017 CV STRESS TEST TRACING ONLY: CPT

## 2022-09-15 PROCEDURE — 93000 ELECTROCARDIOGRAM COMPLETE: CPT | Performed by: INTERNAL MEDICINE

## 2022-09-15 PROCEDURE — 99204 OFFICE O/P NEW MOD 45 MIN: CPT | Performed by: INTERNAL MEDICINE

## 2022-09-15 PROCEDURE — 93018 CV STRESS TEST I&R ONLY: CPT | Performed by: INTERNAL MEDICINE

## 2022-09-15 PROCEDURE — 93016 CV STRESS TEST SUPVJ ONLY: CPT | Performed by: INTERNAL MEDICINE

## 2022-09-15 RX ORDER — LISINOPRIL 5 MG/1
5 TABLET ORAL DAILY
COMMUNITY
Start: 2022-06-10 | End: 2023-06-10

## 2022-09-15 RX ORDER — GABAPENTIN 100 MG/1
100 CAPSULE ORAL 3 TIMES DAILY
COMMUNITY

## 2022-09-15 RX ORDER — MELOXICAM 15 MG/1
15 TABLET ORAL DAILY
COMMUNITY
Start: 2022-06-10

## 2022-09-15 NOTE — PROGRESS NOTES
Date of Office Visit: 09/15/22  Encounter Provider: Erwin Ghosh MD  Place of Service: [facility] CARDIOLOGY  Patient Name: Nixon Chambers  :1950    Chief Complaint   Patient presents with   • Coronary Artery Disease   :     HPI:     Mr. Chambers is 71 y.o. and presents today for evaluation of an elevated calcium score at the request of Dr Ellington.    He has T2DM (oral medications), hypertension, and hyperlipidemia.  He recently had a CT calcium score performed and his score was 202 (LAD 79, ).  He has never smoked.  His father had nonpremature coronary disease.    He says that sometimes when he gets home at the end of the day his chest feels tired and it might ache, but it seems positional and muscular.  He denies exertional chest discomfort or shortness of breath.  He denies syncope, palpitations, lightheadedness, orthopnea, or leg swelling.    Past Medical History:   Diagnosis Date   • Hard to intubate     WITH ROTATOR CUFF SURGERY, WAS INTUBATED WHILE AWAKE X1 WITH MELANOMA SURGERY   • Hyperlipidemia    • Hypertension    • Knee pain, left    • Osteoarthritis of knee 2017   • Sleep apnea     WEARS CPAP   • Type 2 diabetes mellitus (HCC) 2017       Past Surgical History:   Procedure Laterality Date   • KNEE ARTHROPLASTY Right    • [place] TOTAL KNEE ARTHROPLASTY Left 3/7/2017    Procedure: LT TOTAL KNEE ARTHROPLASTY;  Surgeon: Jovana Blackwood MD;  Location: [facility];  Service: Orthopedics   • ROTATOR CUFF REPAIR Right    • SKIN CANCER EXCISION      MID AND LOWER BACK   • SKIN GRAFT      BACK       Social History     Socioeconomic History   • Marital status:    • Number of children: 3   Tobacco Use   • Smoking status: Never Smoker   • Smokeless tobacco: Never Used   Vaping Use   • Vaping Use: Never used   Substance and Sexual Activity   • Alcohol use: Not Currently   • Drug use: No   • Sexual activity: Yes     Partners: Female  "      Family History   Problem Relation Age of Onset   • Heart disease Father         passed at 68 from complications of DM   • Diabetes Brother        Review of Systems   Respiratory: Positive for snoring.    Musculoskeletal: Positive for joint pain and myalgias.   All other systems reviewed and are negative.      No Known Allergies      Current Outpatient Medications:   •  aspirin 81 MG EC tablet, Take 81 mg by mouth Daily., Disp: , Rfl:   •  atorvastatin (LIPITOR) 20 MG tablet, Take 20 mg by mouth Daily., Disp: , Rfl:   •  B Complex Vitamins (VITAMIN B COMPLEX) capsule capsule, Take 1 capsule by mouth Daily., Disp: , Rfl:   •  gabapentin (NEURONTIN) 100 MG capsule, Take 100 mg by mouth 3 (Three) Times a Day., Disp: , Rfl:   •  lisinopril (PRINIVIL,ZESTRIL) 5 MG tablet, Take 5 mg by mouth Daily., Disp: , Rfl:   •  Magnesium 250 MG tablet, Take 250 mg by mouth Daily., Disp: , Rfl:   •  meloxicam (MOBIC) 15 MG tablet, Take 15 mg by mouth Daily., Disp: , Rfl:   •  metFORMIN (GLUCOPHAGE) 1000 MG tablet, Take 1,000 mg by mouth., Disp: , Rfl:   •  Omega-3 Fatty Acids (FISH OIL) 1000 MG capsule capsule, Take 1,000 mg by mouth Daily With Breakfast., Disp: , Rfl:   •  tamsulosin (FLOMAX) 0.4 MG capsule 24 hr capsule, Take 1 capsule by mouth Daily., Disp: , Rfl:   •  Zinc 50 MG tablet, Take 1 tablet by mouth Daily., Disp: , Rfl:       Objective:     Vitals:    09/15/22 1308   BP: 100/60   BP Location: Left arm   Pulse: 88   Weight: 108 kg (238 lb)   Height: 177.8 cm (70\")     Body mass index is 34.15 kg/m².    Vitals reviewed.   Constitutional:       Appearance: Healthy appearance. Well-developed and not in distress.   Eyes:      Conjunctiva/sclera: Conjunctivae normal.   HENT:      Head: Normocephalic.      Nose: Nose normal.         Comments: masked  Neck:      Vascular: No JVD. JVD normal.      Lymphadenopathy: No cervical adenopathy.   Pulmonary:      Effort: Pulmonary effort is normal.      Breath sounds: Normal breath " sounds.   Cardiovascular:      Normal rate. Regular rhythm.      Murmurs: There is no murmur.   Pulses:     Intact distal pulses.   Edema:     Peripheral edema absent.   Abdominal:      Palpations: Abdomen is soft.      Tenderness: There is no abdominal tenderness.   Musculoskeletal: Normal range of motion.      Cervical back: Normal range of motion. Skin:     General: Skin is warm and dry.      Findings: No rash.   Neurological:      General: No focal deficit present.      Mental Status: Alert, oriented to person, place, and time and oriented to person, place and time.      Cranial Nerves: No cranial nerve deficit.   Psychiatric:         Behavior: Behavior normal.         Thought Content: Thought content normal.         Judgment: Judgment normal.           ECG 12 Lead    Date/Time: 9/15/2022 2:33 PM  Performed by: Erwin Ghosh MD  Authorized by: Erwin Ghosh MD   Comparison: not compared with previous ECG   Previous ECG: no previous ECG available  Rhythm: sinus rhythm  Conduction: conduction normal  ST Segments: ST segments normal  T Waves: T waves normal  QRS axis: normal  Other: no other findings    Clinical impression: normal ECG              Assessment:       Diagnosis Plan   1. Agatston CAC score 200-399  Treadmill Stress Test   2. Primary hypertension     3. Mixed hyperlipidemia     4. Type 2 diabetes mellitus without complication, without long-term current use of insulin (HCC)     5. Chest pain, atypical  Treadmill Stress Test        Plan:       Mr Chambers recently had a CACS performed; his score was 202.  In general, we do not recommend coronary artery calcium scoring in patients who are considered to be at high risk of coronary disease.  Is a 71-year-old male diabetic with hyperlipidemia and hypertension, he is high risk at baseline.  He does not really have any classic anginal symptoms.  I also suspect that the symptoms that he feels at night are musculoskeletal.  I did go ahead and put him on the  treadmill today and he exercised for 6 minutes of a Pavan protocol without any symptoms or EKG changes.    Recommended ongoing risk factor reduction.  His blood pressure is extremely well controlled.  His hemoglobin A1c is 6.4%.  His HDL is greater than 40 but his LDL is still slightly above 70. I recommend increasing atorvastatin to 40 mg daily but I will defer that to his PCP.    Sincerely,       Erwin Ghosh MD

## 2024-07-10 NOTE — ANESTHESIA PROCEDURE NOTES
PAIN Epidural block    Patient location during procedure: pain clinic  Start Time: 11/16/2017 8:00 AM  Indication:procedure for pain  Performed By  Anesthesiologist: TRACEY SALDANA  Preanesthetic Checklist  Completed: patient identified, site marked, surgical consent, pre-op evaluation, timeout performed, IV checked, risks and benefits discussed and monitors and equipment checked  Additional Notes  LDDD/LDD  Prep:  Pt Position:prone  Sterile Tech:cap, gloves, mask and sterile barrier  Prep:chlorhexidine gluconate and isopropyl alcohol  Monitoring:blood pressure monitoring, continuous pulse oximetry and EKG  Procedure:  Sedation: yes   Approach:right paramedian  Guidance: fluoroscopy  Location:lumbar  Level:4-5  Needle Type:Tuohy  Needle Gauge:20  Aspiration:negative  Medications:  Depomedrol:80  Preservative Free Saline:3mL  Isovue:2mL    Post Assessment:  Dressing:secured with tape  Pt Tolerance:patient tolerated the procedure well with no apparent complications  Complications:no             spouse

## (undated) DEVICE — DUAL CUT SAGITTAL BLADE

## (undated) DEVICE — ENCORE® LATEX ORTHO SIZE 8, STERILE LATEX POWDER-FREE SURGICAL GLOVE: Brand: ENCORE

## (undated) DEVICE — NDL SPINE 22G 31/2IN BLK

## (undated) DEVICE — UNDERCAST PADDING: Brand: DEROYAL

## (undated) DEVICE — KT DRN EVAC WND PVC PCH WTROC RND 10F400

## (undated) DEVICE — GLV SURG TRIUMPH CLASSIC PF LTX 8 STRL

## (undated) DEVICE — BNDG ELAS ELITE V/CLOSE 6IN 5YD LF STRL

## (undated) DEVICE — DRAPE,U/ SHT,SPLIT,PLAS,STERIL: Brand: MEDLINE

## (undated) DEVICE — SUT VIC 0 CT1 36IN J946H

## (undated) DEVICE — ENCORE® LATEX ORTHO SIZE 6.5, STERILE LATEX POWDER-FREE SURGICAL GLOVE: Brand: ENCORE

## (undated) DEVICE — RECIPROCATING BLADE, DOUBLE SIDED, OFFSET  (70.0 X 0.64 X 12.6MM)

## (undated) DEVICE — SPNG GZ WOVN 4X4IN 12PLY 10/BX STRL

## (undated) DEVICE — PROXIMATE RH ROTATING HEAD SKIN STAPLERS (35 WIDE) CONTAINS 35 STAINLESS STEEL STAPLES: Brand: PROXIMATE

## (undated) DEVICE — IMMOB KN 3PNL DLX CANVS 22IN BLU

## (undated) DEVICE — SKIN PREP TRAY W/CHG: Brand: MEDLINE INDUSTRIES, INC.

## (undated) DEVICE — DRAPE,REIN 53X77,STERILE: Brand: MEDLINE

## (undated) DEVICE — SUT ETHIB 0 CT1 CR8 18IN CX21D

## (undated) DEVICE — P.F.C. DRILL BIT AND STEINMAN PIN PACKET (1 UNIT) .125IN DIA 5IN LGTH: Brand: P.F.C.

## (undated) DEVICE — IRRIGATOR BULB ASEPTO 60CC STRL

## (undated) DEVICE — GLV SURG TRIUMPH CLASSIC PF LTX 6.5 STRL

## (undated) DEVICE — APPL CHLORAPREP W/TINT 26ML ORNG

## (undated) DEVICE — PAD,ABDOMINAL,8"X10",ST,LF: Brand: MEDLINE

## (undated) DEVICE — SOL ISO/ALC RUB 70PCT 4OZ

## (undated) DEVICE — PK KN TOTL 40

## (undated) DEVICE — BNDG ELAS CO-FLEX SLF ADHR 6IN 5YD LF STRL

## (undated) DEVICE — SYR CONTRL LUERLOK 10CC

## (undated) DEVICE — CONTAINER,SPECIMEN,OR STERILE,4OZ: Brand: MEDLINE

## (undated) DEVICE — GLV SURG BIOGEL LTX PF 6 1/2

## (undated) DEVICE — GLV SURG BIOGEL LTX PF 8

## (undated) DEVICE — SUT VIC 3/0 CTI 36IN J944H

## (undated) DEVICE — OCCLUSIVE GAUZE STRIP,3% BISMUTH TRIBROMOPHENATE IN PETROLATUM BLEND: Brand: XEROFORM